# Patient Record
Sex: MALE | Race: BLACK OR AFRICAN AMERICAN | NOT HISPANIC OR LATINO | Employment: UNEMPLOYED | URBAN - METROPOLITAN AREA
[De-identification: names, ages, dates, MRNs, and addresses within clinical notes are randomized per-mention and may not be internally consistent; named-entity substitution may affect disease eponyms.]

---

## 2023-06-19 ENCOUNTER — HOSPITAL ENCOUNTER (EMERGENCY)
Facility: HOSPITAL | Age: 33
Discharge: HOME | End: 2023-06-19
Attending: EMERGENCY MEDICINE
Payer: MEDICAID

## 2023-06-19 VITALS
DIASTOLIC BLOOD PRESSURE: 88 MMHG | OXYGEN SATURATION: 99 % | BODY MASS INDEX: 26.6 KG/M2 | SYSTOLIC BLOOD PRESSURE: 139 MMHG | RESPIRATION RATE: 18 BRPM | HEART RATE: 91 BPM | TEMPERATURE: 99 F | HEIGHT: 71 IN | WEIGHT: 190 LBS

## 2023-06-19 DIAGNOSIS — K61.1 PERIRECTAL ABSCESS: Primary | ICD-10-CM

## 2023-06-19 LAB
ALBUMIN SERPL-MCNC: 3.6 G/DL (ref 3.5–5)
ALBUMIN/GLOB SERPL: 0.8 RATIO (ref 1.1–2)
ALP SERPL-CCNC: 90 UNIT/L (ref 40–150)
ALT SERPL-CCNC: 18 UNIT/L (ref 0–55)
APPEARANCE UR: CLEAR
AST SERPL-CCNC: 16 UNIT/L (ref 5–34)
BACTERIA #/AREA URNS AUTO: NORMAL /HPF
BASOPHILS # BLD AUTO: 0.04 X10(3)/MCL
BASOPHILS NFR BLD AUTO: 0.3 %
BILIRUB UR QL STRIP.AUTO: NEGATIVE MG/DL
BILIRUBIN DIRECT+TOT PNL SERPL-MCNC: 0.4 MG/DL
BUN SERPL-MCNC: 7.2 MG/DL (ref 8.9–20.6)
CALCIUM SERPL-MCNC: 9.1 MG/DL (ref 8.4–10.2)
CHLORIDE SERPL-SCNC: 104 MMOL/L (ref 98–107)
CO2 SERPL-SCNC: 25 MMOL/L (ref 22–29)
COLOR UR: YELLOW
CREAT SERPL-MCNC: 1.04 MG/DL (ref 0.73–1.18)
EOSINOPHIL # BLD AUTO: 0.05 X10(3)/MCL (ref 0–0.9)
EOSINOPHIL NFR BLD AUTO: 0.4 %
ERYTHROCYTE [DISTWIDTH] IN BLOOD BY AUTOMATED COUNT: 14.3 % (ref 11.5–17)
GFR SERPLBLD CREATININE-BSD FMLA CKD-EPI: >60 MLS/MIN/1.73/M2
GLOBULIN SER-MCNC: 4.3 GM/DL (ref 2.4–3.5)
GLUCOSE SERPL-MCNC: 98 MG/DL (ref 74–100)
GLUCOSE UR QL STRIP.AUTO: NEGATIVE MG/DL
HCT VFR BLD AUTO: 40.2 % (ref 42–52)
HGB BLD-MCNC: 14 G/DL (ref 14–18)
IMM GRANULOCYTES # BLD AUTO: 0.05 X10(3)/MCL (ref 0–0.04)
IMM GRANULOCYTES NFR BLD AUTO: 0.4 %
KETONES UR QL STRIP.AUTO: NEGATIVE MG/DL
LEUKOCYTE ESTERASE UR QL STRIP.AUTO: NEGATIVE UNIT/L
LYMPHOCYTES # BLD AUTO: 5.14 X10(3)/MCL (ref 0.6–4.6)
LYMPHOCYTES NFR BLD AUTO: 43 %
MCH RBC QN AUTO: 28.1 PG (ref 27–31)
MCHC RBC AUTO-ENTMCNC: 34.8 G/DL (ref 33–36)
MCV RBC AUTO: 80.7 FL (ref 80–94)
MONOCYTES # BLD AUTO: 1.37 X10(3)/MCL (ref 0.1–1.3)
MONOCYTES NFR BLD AUTO: 11.5 %
NEUTROPHILS # BLD AUTO: 5.3 X10(3)/MCL (ref 2.1–9.2)
NEUTROPHILS NFR BLD AUTO: 44.4 %
NITRITE UR QL STRIP.AUTO: NEGATIVE
NRBC BLD AUTO-RTO: 0 %
PH UR STRIP.AUTO: >=9 [PH]
PLATELET # BLD AUTO: 397 X10(3)/MCL (ref 130–400)
PMV BLD AUTO: 9.9 FL (ref 7.4–10.4)
POTASSIUM SERPL-SCNC: 4.4 MMOL/L (ref 3.5–5.1)
PROT SERPL-MCNC: 7.9 GM/DL (ref 6.4–8.3)
PROT UR QL STRIP.AUTO: ABNORMAL MG/DL
RBC # BLD AUTO: 4.98 X10(6)/MCL (ref 4.7–6.1)
RBC #/AREA URNS AUTO: <5 /HPF
RBC UR QL AUTO: NEGATIVE UNIT/L
SODIUM SERPL-SCNC: 136 MMOL/L (ref 136–145)
SP GR UR STRIP.AUTO: 1.02 (ref 1–1.03)
SQUAMOUS #/AREA URNS AUTO: <5 /HPF
UROBILINOGEN UR STRIP-ACNC: 1 MG/DL
WBC # SPEC AUTO: 11.95 X10(3)/MCL (ref 4.5–11.5)
WBC #/AREA URNS AUTO: <5 /HPF

## 2023-06-19 PROCEDURE — 85025 COMPLETE CBC W/AUTO DIFF WBC: CPT | Performed by: NURSE PRACTITIONER

## 2023-06-19 PROCEDURE — 80053 COMPREHEN METABOLIC PANEL: CPT | Performed by: NURSE PRACTITIONER

## 2023-06-19 PROCEDURE — 10060 I&D ABSCESS SIMPLE/SINGLE: CPT

## 2023-06-19 PROCEDURE — 81001 URINALYSIS AUTO W/SCOPE: CPT | Performed by: NURSE PRACTITIONER

## 2023-06-19 PROCEDURE — 25500020 PHARM REV CODE 255

## 2023-06-19 PROCEDURE — 99285 EMERGENCY DEPT VISIT HI MDM: CPT | Mod: 25

## 2023-06-19 RX ORDER — LIDOCAINE HYDROCHLORIDE 10 MG/ML
INJECTION INFILTRATION; PERINEURAL
Status: DISCONTINUED
Start: 2023-06-19 | End: 2023-06-20 | Stop reason: HOSPADM

## 2023-06-19 RX ORDER — SULFAMETHOXAZOLE AND TRIMETHOPRIM 800; 160 MG/1; MG/1
1 TABLET ORAL 2 TIMES DAILY
Qty: 20 TABLET | Refills: 0 | Status: SHIPPED | OUTPATIENT
Start: 2023-06-19 | End: 2023-06-29

## 2023-06-19 RX ORDER — HYDROCODONE BITARTRATE AND ACETAMINOPHEN 5; 325 MG/1; MG/1
1 TABLET ORAL EVERY 8 HOURS PRN
Qty: 12 TABLET | Refills: 0 | Status: SHIPPED | OUTPATIENT
Start: 2023-06-19 | End: 2023-06-23

## 2023-06-19 RX ADMIN — IOPAMIDOL 100 ML: 755 INJECTION, SOLUTION INTRAVENOUS at 08:06

## 2023-06-19 NOTE — FIRST PROVIDER EVALUATION
Medical screening examination initiated.  I have conducted a focused provider triage encounter, findings are as follows:    Brief history of present illness:  33 y/o male presents with rectal pain and drainage. Hx perirectal abscess 3/2022 which was surgically drained. Feels similar. +chills/sweats.     There were no vitals filed for this visit.    Pertinent physical exam:  alert, nonlabored, ambulatory     Brief workup plan:  labs, urine    Preliminary workup initiated; this workup will be continued and followed by the physician or advanced practice provider that is assigned to the patient when roomed.   Patient notified  Patient would like a visit with our mental health provider in the office  Scheduled appt on 4/27

## 2023-06-20 NOTE — ED PROVIDER NOTES
Encounter Date: 6/19/2023       History     Chief Complaint   Patient presents with    Wound Check     Pt reports having pain in rectum and yellow discharge with bowel movement from rectum. Hx of previous rectal abcess. Denies fevers, reports chills. Has been taking amoxicillin from mothers prescription. Denies hx of DM.     32-year-old male presents to ED for evaluation of rectal pain for the past 3 days.  Patient reports pain to his left butt cheek.  Reports feeling like he has a rectal abscess.  States that he previously had an abscess that had to be drained.  Denies any fever.  Complains of yellow drainage.  Reports he has been having chills at home.  Started taking amoxicillin given to him by his mother.  Denies any history of diabetes.    The history is provided by the patient. No  was used.   Review of patient's allergies indicates:  No Known Allergies  No past medical history on file.  No past surgical history on file.  No family history on file.     Review of Systems   Constitutional:  Negative for chills, fatigue and fever.   HENT:  Negative for sore throat.    Respiratory:  Negative for cough and shortness of breath.    Cardiovascular:  Negative for chest pain.   Gastrointestinal:  Positive for rectal pain. Negative for abdominal pain, nausea and vomiting.   Genitourinary:  Negative for dysuria and frequency.   Musculoskeletal:  Negative for back pain, myalgias and neck pain.   Skin:  Negative for rash.   Neurological:  Negative for dizziness, weakness and headaches.   Hematological:  Does not bruise/bleed easily.   All other systems reviewed and are negative.    Physical Exam     Initial Vitals [06/19/23 1621]   BP Pulse Resp Temp SpO2   139/88 91 18 98.7 °F (37.1 °C) 99 %      MAP       --         Physical Exam    Nursing note and vitals reviewed.  Constitutional: He appears well-developed. He is cooperative.   HENT:   Head: Normocephalic and atraumatic.   Right Ear: Tympanic  membrane and external ear normal.   Left Ear: Tympanic membrane and external ear normal.   Mouth/Throat: Uvula is midline, oropharynx is clear and moist and mucous membranes are normal. No trismus in the jaw. No uvula swelling.   Eyes: Conjunctivae are normal. Pupils are equal, round, and reactive to light.   Neck: Neck supple.   Normal range of motion.  Cardiovascular:  Normal rate, regular rhythm and normal heart sounds.           Pulmonary/Chest: Breath sounds normal. No respiratory distress. He has no wheezes. He has no rhonchi. He has no rales.   Abdominal: Abdomen is soft. Bowel sounds are normal. There is no abdominal tenderness.   Genitourinary:    Genitourinary Comments: 2 cm area raised with fluctuance.  Mild erythema.  No drainage noted.  Areas not track to anus.  Exam performed with ERVIN Walton at bedside         Musculoskeletal:         General: Normal range of motion.      Cervical back: Normal range of motion and neck supple.     Neurological: He is alert and oriented to person, place, and time.   Skin: Skin is warm and dry. Capillary refill takes less than 2 seconds.   Psychiatric: He has a normal mood and affect.       ED Course   I & D - Incision and Drainage    Date/Time: 2023 10:54 PM  Location procedure was performed: [facility] EMERGENCY DEPARTMENT  Performed by: BULMARO Andrade  Authorized by: Christal Gaming MD   Consent Done: Yes  Consent: Verbal consent obtained.  Patient identity confirmed: , name and verbally with patient  Type: abscess  Location: Left buttock.  Anesthesia: local infiltration    Anesthesia:  Local Anesthetic: lidocaine 1% without epinephrine  Anesthetic total: 4 mL  Scalpel size: 11  Incision type: single straight  Incision depth: dermal  Complexity: simple  Drainage: pus and bloody  Drainage amount: scant  Wound treatment: wound left open, drainage, deloculation and wound packed  Packing material: 1/4 in iodoform gauze  Patient tolerance: Patient tolerated the procedure  well with no immediate complications    Incision depth: dermal      Labs Reviewed   CBC WITH DIFFERENTIAL - Abnormal; Notable for the following components:       Result Value    WBC 11.95 (*)     Hct 40.2 (*)     Lymph # 5.14 (*)     Mono # 1.37 (*)     IG# 0.05 (*)     All other components within normal limits   COMPREHENSIVE METABOLIC PANEL - Abnormal; Notable for the following components:    Blood Urea Nitrogen 7.2 (*)     Globulin 4.3 (*)     Albumin/Globulin Ratio 0.8 (*)     All other components within normal limits   URINALYSIS, REFLEX TO URINE CULTURE - Abnormal; Notable for the following components:    pH, UA >=9.0 (*)     Protein, UA 1+ (*)     All other components within normal limits   URINALYSIS, MICROSCOPIC - Normal          Imaging Results              CT Pelvis With Contrast (Final result)  Result time 06/19/23 20:37:24      Final result by Charly Greenberg MD (06/19/23 20:37:24)                   Impression:      1. Left perianal subcutaneous rim enhancing fluid collection with surrounding inflammatory standings consistent with an abscess.    2. Pelvic large free fluid is of indeterminate cause.    3.  Bilateral inguinal enlarged lymph nodes likely are reactive.      Electronically signed by: Charly Greenberg  Date:    06/19/2023  Time:    20:37               Narrative:    EXAMINATION:  CT PELVIS WITH  CONTRAST    CLINICAL HISTORY:  Anal/rectal abscess;    TECHNIQUE:  Multidetector axial images were performed of the pelvis in various planes by the sonographer.    Dose length product was 251 mGycm. Automated radiation control was utilized to minimize radiation dose.    COMPARISON:  None available    FINDINGS:  There is left perianal rim enhancing fluid collection consistent with an abscess which measures 3.6 x 1.8 cm on image 51 series 2.  There are surrounding soft tissue inflammations.  There does not appear to be anorectal mural involvement.    Within the pelvis, there is large free fluid with  anterior-posterior diameter of 10.6 cm, transverse diameter of 7.0 cm and maximum length of 11.0 cm on image 27 series 2 and image 65 series 8.  The cause of this free fluid is indeterminate.  Small bowel loops and colon within the pelvis are nondistended and there are no pericolonic acute standings.  Appendix is also unremarkable.  There are bilateral inguinal enhancing enlarged lymph nodes up to 2.2 cm and likely are reactive/ urinary bladder wall is not thickened.                                       Medications   LIDOcaine HCL 10 mg/ml (1%) 10 mg/mL (1 %) injection (has no administration in time range)   iopamidoL (ISOVUE-370) injection 100 mL (100 mLs Intravenous Given 6/19/23 2021)     Medical Decision Making:   Initial Assessment:   32-year-old male presents to ED for evaluation of rectal pain for the past 3 days.  Patient reports pain to his left butt cheek.  Reports feeling like he has a rectal abscess.  States that he previously had an abscess that had to be drained.  Denies any fever.  Complains of yellow drainage.  Reports he has been having chills at home.  Started taking amoxicillin given to him by his mother.  Denies any history of diabetes.  Differential Diagnosis:   Perirectal abscess, rectal abscess, cellulitis  Clinical Tests:   Lab Tests: Ordered and Reviewed  ED Management:  32-year-old male presents to ED for evaluation of perirectal abscess.  Patient reports history of abscess.  States he has been having pain and swelling for the last 3 days.  Mild leukocytosis noted.  I&D performed with minimal drainage.  Wound packed.  Patient tolerated well.  Discussed pulling out packing in 2-3 days.  Will place on antibiotics. Return ED precautions given.  I provided counseling to patient with regard to condition, the treatment plan, specific conditions for return, and the importance of follow up. Detailed written and verbal instructions provided to patient and he expressed a verbal understanding of the  discharge instructions and overall management plan. Reiterated the importance of medication administration and safety. Advised patient to follow up with primary care provider in 3-5 days or sooner if needed.  Answered questions at this time. The patient is stable for discharge.                           Clinical Impression:   Final diagnoses:  [K61.1] Perirectal abscess (Primary)        ED Disposition Condition    Discharge Stable          ED Prescriptions       Medication Sig Dispense Start Date End Date Auth. Provider    sulfamethoxazole-trimethoprim 800-160mg (BACTRIM DS) 800-160 mg Tab Take 1 tablet by mouth 2 (two) times daily. for 10 days 20 tablet 6/19/2023 6/29/2023 BULMARO Andrade    HYDROcodone-acetaminophen (NORCO) 5-325 mg per tablet Take 1 tablet by mouth every 8 (eight) hours as needed for Pain. 12 tablet 6/19/2023 6/23/2023 BULMARO Andrade          Follow-up Information       Follow up With Specialties Details Why Contact Info    PCP  In 1 week As needed, If you do not have a PCP you may call [phone] to help get set up If you do not have a PCP you may call [phone] to help get set up.             BULMARO Andrade  06/19/23 1100

## 2023-06-20 NOTE — DISCHARGE INSTRUCTIONS
Apply warm compresses to area.  Take full course of antibiotics.  Remove packing in 2-3 days.  Use ibuprofen and Tylenol for pain and swelling.  May use Norco for severe pain.  Do not drink or drive while taking narcotics this can be sedating.

## 2023-08-25 ENCOUNTER — HOSPITAL ENCOUNTER (EMERGENCY)
Facility: HOSPITAL | Age: 33
Discharge: LEFT BEFORE DISCHARGE | End: 2023-08-25
Attending: EMERGENCY MEDICINE
Payer: MEDICAID

## 2023-08-25 VITALS
TEMPERATURE: 99 F | WEIGHT: 183 LBS | HEART RATE: 76 BPM | BODY MASS INDEX: 25.62 KG/M2 | RESPIRATION RATE: 18 BRPM | OXYGEN SATURATION: 100 % | HEIGHT: 71 IN | DIASTOLIC BLOOD PRESSURE: 95 MMHG | SYSTOLIC BLOOD PRESSURE: 156 MMHG

## 2023-08-25 DIAGNOSIS — B16.9 ACUTE VIRAL HEPATITIS B WITHOUT COMA AND WITHOUT DELTA AGENT: Primary | ICD-10-CM

## 2023-08-25 DIAGNOSIS — R17 SCLERAL ICTERUS: ICD-10-CM

## 2023-08-25 LAB
ABS NEUT CALC (OHS): 3.1 X10(3)/MCL (ref 2.1–9.2)
ALBUMIN SERPL-MCNC: 3.7 G/DL (ref 3.5–5)
ALBUMIN/GLOB SERPL: 0.9 RATIO (ref 1.1–2)
ALP SERPL-CCNC: 180 UNIT/L (ref 40–150)
ALT SERPL-CCNC: 2351 UNIT/L (ref 0–55)
AMPHET UR QL SCN: NEGATIVE
ANISOCYTOSIS BLD QL SMEAR: ABNORMAL
APAP SERPL-MCNC: <17.4 UG/ML (ref 17.4–30)
APPEARANCE UR: ABNORMAL
AST SERPL-CCNC: 1557 UNIT/L (ref 5–34)
BACTERIA #/AREA URNS AUTO: ABNORMAL /HPF
BARBITURATE SCN PRESENT UR: NEGATIVE
BENZODIAZ UR QL SCN: POSITIVE
BILIRUB SERPL-MCNC: 9.6 MG/DL
BILIRUB UR QL STRIP.AUTO: ABNORMAL
BUN SERPL-MCNC: 7.4 MG/DL (ref 8.9–20.6)
CALCIUM SERPL-MCNC: 9.6 MG/DL (ref 8.4–10.2)
CANNABINOIDS UR QL SCN: POSITIVE
CHLORIDE SERPL-SCNC: 103 MMOL/L (ref 98–107)
CO2 SERPL-SCNC: 28 MMOL/L (ref 22–29)
COCAINE UR QL SCN: NEGATIVE
COLOR UR: ABNORMAL
CREAT SERPL-MCNC: 1.16 MG/DL (ref 0.73–1.18)
EOSINOPHIL NFR BLD MANUAL: 0.08 X10(3)/MCL (ref 0–0.9)
EOSINOPHIL NFR BLD MANUAL: 1 % (ref 0–8)
ERYTHROCYTE [DISTWIDTH] IN BLOOD BY AUTOMATED COUNT: 17 % (ref 11.5–17)
ETHANOL SERPL-MCNC: <10 MG/DL
FENTANYL UR QL SCN: NEGATIVE
GFR SERPLBLD CREATININE-BSD FMLA CKD-EPI: >60 MLS/MIN/1.73/M2
GLOBULIN SER-MCNC: 4 GM/DL (ref 2.4–3.5)
GLUCOSE SERPL-MCNC: 106 MG/DL (ref 74–100)
GLUCOSE UR QL STRIP.AUTO: NORMAL
HAV IGM SERPL QL IA: NONREACTIVE
HBV CORE IGM SERPL QL IA: REACTIVE
HBV SURFACE AG SERPL QL IA: REACTIVE
HBV SURFACE AG SERPLBLD QL IA.RAPID: NORMAL
HCT VFR BLD AUTO: 37.9 % (ref 42–52)
HCV AB SERPL QL IA: NONREACTIVE
HGB BLD-MCNC: 14.3 G/DL (ref 14–18)
HIV 1+2 AB+HIV1 P24 AG SERPL QL IA: NONREACTIVE
HYPOCHROMIA BLD QL SMEAR: ABNORMAL
INR PPP: 1.1
KETONES UR QL STRIP.AUTO: NEGATIVE
LACTATE SERPL-SCNC: 1.2 MMOL/L (ref 0.5–2.2)
LEUKOCYTE ESTERASE UR QL STRIP.AUTO: NEGATIVE
LYMPH ABN # BLD MANUAL: 1 %
LYMPHOCYTES NFR BLD MANUAL: 3.81 X10(3)/MCL
LYMPHOCYTES NFR BLD MANUAL: 48 % (ref 13–40)
MCH RBC QN AUTO: 28 PG (ref 27–31)
MCHC RBC AUTO-ENTMCNC: 37.7 G/DL (ref 33–36)
MCV RBC AUTO: 74.2 FL (ref 80–94)
MDMA UR QL SCN: NEGATIVE
MONOCYTES NFR BLD MANUAL: 0.87 X10(3)/MCL (ref 0.1–1.3)
MONOCYTES NFR BLD MANUAL: 11 % (ref 2–11)
MUCOUS THREADS URNS QL MICRO: ABNORMAL /LPF
NEUTROPHILS NFR BLD MANUAL: 39 % (ref 47–80)
NITRITE UR QL STRIP.AUTO: NEGATIVE
NRBC BLD AUTO-RTO: 0 %
OPIATES UR QL SCN: NEGATIVE
PCP UR QL: NEGATIVE
PH UR STRIP.AUTO: 6.5 [PH]
PH UR: 6.5 [PH] (ref 3–11)
PLATELET # BLD AUTO: 384 X10(3)/MCL (ref 130–400)
PLATELET # BLD EST: ADEQUATE 10*3/UL
PMV BLD AUTO: 10.6 FL (ref 7.4–10.4)
POTASSIUM SERPL-SCNC: 3.6 MMOL/L (ref 3.5–5.1)
PROT SERPL-MCNC: 7.7 GM/DL (ref 6.4–8.3)
PROT UR QL STRIP.AUTO: ABNORMAL
PROTHROMBIN TIME: 13.5 SECONDS (ref 11.4–14)
RBC # BLD AUTO: 5.11 X10(6)/MCL (ref 4.7–6.1)
RBC #/AREA URNS AUTO: ABNORMAL /HPF
RBC MORPH BLD: ABNORMAL
RBC UR QL AUTO: NEGATIVE
SODIUM SERPL-SCNC: 140 MMOL/L (ref 136–145)
SP GR UR STRIP.AUTO: 1.02
SQUAMOUS #/AREA URNS LPF: ABNORMAL /HPF
TARGETS BLD QL SMEAR: ABNORMAL
UROBILINOGEN UR STRIP-ACNC: ABNORMAL
WBC # SPEC AUTO: 7.94 X10(3)/MCL (ref 4.5–11.5)
WBC #/AREA URNS AUTO: ABNORMAL /HPF

## 2023-08-25 PROCEDURE — 85610 PROTHROMBIN TIME: CPT | Performed by: PHYSICIAN ASSISTANT

## 2023-08-25 PROCEDURE — 83605 ASSAY OF LACTIC ACID: CPT | Performed by: PHYSICIAN ASSISTANT

## 2023-08-25 PROCEDURE — 81001 URINALYSIS AUTO W/SCOPE: CPT | Performed by: PHYSICIAN ASSISTANT

## 2023-08-25 PROCEDURE — 85027 COMPLETE CBC AUTOMATED: CPT | Performed by: PHYSICIAN ASSISTANT

## 2023-08-25 PROCEDURE — 80074 ACUTE HEPATITIS PANEL: CPT | Performed by: PHYSICIAN ASSISTANT

## 2023-08-25 PROCEDURE — 80053 COMPREHEN METABOLIC PANEL: CPT | Performed by: PHYSICIAN ASSISTANT

## 2023-08-25 PROCEDURE — 99283 EMERGENCY DEPT VISIT LOW MDM: CPT

## 2023-08-25 PROCEDURE — 87341 HEP B SURFACE AG NEUTRLZJ IA: CPT | Performed by: PHYSICIAN ASSISTANT

## 2023-08-25 PROCEDURE — 80307 DRUG TEST PRSMV CHEM ANLYZR: CPT | Performed by: PHYSICIAN ASSISTANT

## 2023-08-25 PROCEDURE — 80143 DRUG ASSAY ACETAMINOPHEN: CPT | Performed by: PHYSICIAN ASSISTANT

## 2023-08-25 PROCEDURE — 87389 HIV-1 AG W/HIV-1&-2 AB AG IA: CPT | Performed by: PHYSICIAN ASSISTANT

## 2023-08-25 PROCEDURE — 82077 ASSAY SPEC XCP UR&BREATH IA: CPT | Performed by: PHYSICIAN ASSISTANT

## 2023-08-25 NOTE — FIRST PROVIDER EVALUATION
"Medical screening examination initiated.  I have conducted a focused provider triage encounter, findings are as follows:    Brief history of present illness:  yellow sclera with dark colored urine X 4-5 days, N/V X same and recent tattoo from a friend    Vitals:    08/25/23 1852   BP: (!) 170/106   Pulse: 90   Resp: 16   Temp: 98.6 °F (37 °C)   TempSrc: Oral   SpO2: 100%   Weight: 83 kg (182 lb 15.7 oz)   Height: 5' 11" (1.803 m)       Pertinent physical exam:  scleral icterus noted, hypertensive in triage    Brief workup plan:  labs ordered    Preliminary workup initiated; this workup will be continued and followed by the physician or advanced practice provider that is assigned to the patient when roomed.  "

## 2023-08-25 NOTE — LETTER
Patient: Vickey Coreas  YOB: 1990  Date: 8/25/2023 Time: 9:09 PM  Location: [facility]    Leaving the Hospital Against Medical Advice    Chart #:[id number]    This will certify that I, the undersigned,    ______________________________________________________________________    A patient in the above named medical center, having requested discharge and removal from the medical center against the advice of my attending physician(s), hereby release [facility], its physicians, officers and employees, severally and individually, from any and all liability of any nature whatsoever for any injury or harm or complication of any kind that may result directly or indirectly, by reason of my terminating my stay as a patient at [facility] and my departure from [facility], and hereby waive any and all rights of action I may now have or later acquire as a result of my voluntary departure from [facility] and the termination of my stay as a patient therein.    This release is made with the full knowledge of the danger that may result from the action which I am taking.      Date:_______________________                         ___________________________                                                                                    Patient/Legal Representative    Witness:        ____________________________                          ___________________________  Nurse                                                                        Physician

## 2023-08-26 NOTE — ED NOTES
Pt wants to leave AMA. States he will come back tomorrow to get his labs checked, because he has plans tonight. BULMARO Jane educated pt on possible risks with leaving AMA. Pt verbalized understanding and still wants to go home. PA in room. Pt signed AMA form and ambulated out of the ED with steady gait.

## 2023-08-29 LAB — PATH REV: NORMAL

## 2023-09-05 ENCOUNTER — TELEPHONE (OUTPATIENT)
Dept: INFECTIOUS DISEASES | Facility: CLINIC | Age: 33
End: 2023-09-05
Payer: MEDICAID

## 2023-09-05 DIAGNOSIS — B19.10 HEPATITIS B INFECTION WITHOUT DELTA AGENT WITHOUT HEPATIC COMA, UNSPECIFIED CHRONICITY: Primary | ICD-10-CM

## 2023-09-05 NOTE — TELEPHONE ENCOUNTER
----- Message from Betina Bella sent at 9/1/2023  2:29 PM CDT -----  Regarding: Labs  Pt is scheduled for a New Hep B appt on 9/18 and is requesting labs faxed to [facility].

## 2023-11-30 ENCOUNTER — TELEPHONE (OUTPATIENT)
Dept: INFECTIOUS DISEASES | Facility: CLINIC | Age: 33
End: 2023-11-30
Payer: MEDICAID

## 2023-11-30 NOTE — TELEPHONE ENCOUNTER
----- Message from [employer] sent at 11/28/2023 11:55 AM CST -----  Regarding: Unable to Schedule  Good Morning  We were unable to contact this patient to schedule imaging.  Please see notes below  Thanks    11-28-23 call to [facility]-no ans-nvm set up...sfd 11:46  10/13 no show--TC  09/11/2023 at 2:37pm no answer, no vm, unable to reach patrient--[facility]  [facility]  9/5/23ag called to [facility] no

## 2023-12-20 ENCOUNTER — HOSPITAL ENCOUNTER (EMERGENCY)
Facility: HOSPITAL | Age: 33
Discharge: HOME | End: 2023-12-20
Attending: EMERGENCY MEDICINE
Payer: MEDICAID

## 2023-12-20 VITALS
OXYGEN SATURATION: 99 % | HEIGHT: 71 IN | HEART RATE: 96 BPM | SYSTOLIC BLOOD PRESSURE: 164 MMHG | WEIGHT: 192 LBS | DIASTOLIC BLOOD PRESSURE: 95 MMHG | BODY MASS INDEX: 26.88 KG/M2 | RESPIRATION RATE: 18 BRPM | TEMPERATURE: 98 F

## 2023-12-20 DIAGNOSIS — J06.9 VIRAL URI WITH COUGH: Primary | ICD-10-CM

## 2023-12-20 PROCEDURE — 96372 THER/PROPH/DIAG INJ SC/IM: CPT | Performed by: PHYSICIAN ASSISTANT

## 2023-12-20 PROCEDURE — 63600175 PHARM REV CODE 636 W HCPCS: Performed by: PHYSICIAN ASSISTANT

## 2023-12-20 PROCEDURE — 99284 EMERGENCY DEPT VISIT MOD MDM: CPT

## 2023-12-20 RX ORDER — BENZONATATE 100 MG/1
100 CAPSULE ORAL 3 TIMES DAILY PRN
Qty: 20 CAPSULE | Refills: 0 | Status: SHIPPED | OUTPATIENT
Start: 2023-12-20 | End: 2023-12-30

## 2023-12-20 RX ORDER — FLUTICASONE PROPIONATE 50 MCG
1 SPRAY, SUSPENSION (ML) NASAL 2 TIMES DAILY PRN
Qty: 15 G | Refills: 0 | Status: SHIPPED | OUTPATIENT
Start: 2023-12-20

## 2023-12-20 RX ORDER — LORATADINE 10 MG/1
10 TABLET ORAL DAILY
Qty: 10 TABLET | Refills: 0 | Status: SHIPPED | OUTPATIENT
Start: 2023-12-20 | End: 2023-12-30

## 2023-12-20 RX ORDER — DEXAMETHASONE SODIUM PHOSPHATE 4 MG/ML
4 INJECTION, SOLUTION INTRA-ARTICULAR; INTRALESIONAL; INTRAMUSCULAR; INTRAVENOUS; SOFT TISSUE
Status: COMPLETED | OUTPATIENT
Start: 2023-12-20 | End: 2023-12-20

## 2023-12-20 RX ADMIN — DEXAMETHASONE SODIUM PHOSPHATE 4 MG: 4 INJECTION, SOLUTION INTRA-ARTICULAR; INTRALESIONAL; INTRAMUSCULAR; INTRAVENOUS; SOFT TISSUE at 08:12

## 2023-12-21 NOTE — ED PROVIDER NOTES
Encounter Date: 12/20/2023       History     Chief Complaint   Patient presents with    Nasal Congestion     Pt presents to ed with reports of congestion x1 week with cough.      Vickey Coreas is a 33 y.o. male with a history of HTN who presents to the ED complaining of cough and congestion x 1 week. His wife was sick with similar symptoms. He has not tried taking any OTC medications. Requesting steroid shot for symptom relief. Denies fever, chills, chest pain, SOB, N/V/D.    The history is provided by the patient.     Review of patient's allergies indicates:  No Known Allergies  Past Medical History:   Diagnosis Date    Hypertension      No past surgical history on file.  No family history on file.  Social History     Tobacco Use    Smoking status: Every Day     Current packs/day: 1.00     Types: Cigarettes    Smokeless tobacco: Never     Review of Systems   Constitutional:  Negative for activity change, chills and fever.   HENT:  Positive for congestion. Negative for trouble swallowing.    Eyes:  Negative for photophobia and visual disturbance.   Respiratory:  Positive for cough. Negative for chest tightness, shortness of breath and wheezing.    Cardiovascular:  Negative for chest pain, palpitations and leg swelling.   Gastrointestinal:  Negative for abdominal pain, constipation, diarrhea, nausea and vomiting.   Genitourinary:  Negative for dysuria, frequency, hematuria and urgency.   Musculoskeletal:  Negative for arthralgias, back pain and gait problem.   Skin:  Negative for color change and rash.   Neurological:  Negative for dizziness, syncope, weakness, light-headedness, numbness and headaches.   Psychiatric/Behavioral:  Negative for agitation and confusion. The patient is not nervous/anxious.        Physical Exam     Initial Vitals [12/20/23 1953]   BP Pulse Resp Temp SpO2   (!) 164/95 96 18 98 °F (36.7 °C) 99 %      MAP       --         Physical Exam    Nursing note and vitals reviewed.  Constitutional: He  appears well-developed and well-nourished. No distress.   HENT:   Head: Normocephalic and atraumatic.   Mouth/Throat: No oropharyngeal exudate.   Nasal congestion. Postnasal drip.   Eyes: EOM are normal. No scleral icterus.   Neck: Neck supple.   Normal range of motion.  Cardiovascular:  Normal rate and regular rhythm.           No murmur heard.  Pulmonary/Chest: No respiratory distress. He has no wheezes.   Abdominal: Abdomen is soft. He exhibits no distension. There is no abdominal tenderness.   Musculoskeletal:         General: No edema. Normal range of motion.      Cervical back: Normal range of motion and neck supple.     Neurological: He is alert and oriented to person, place, and time. No cranial nerve deficit.   Skin: Skin is warm and dry. Capillary refill takes less than 2 seconds. No erythema.   Psychiatric: He has a normal mood and affect. Thought content normal.         ED Course   Procedures  Labs Reviewed - No data to display       Imaging Results    None          Medications   dexAMETHasone injection 4 mg (4 mg Intramuscular Given 12/20/23 2021)     Medical Decision Making  Pt given IM decadron for symptomatic relief. Will discharge with claritin, flonase, and tessalon prn for cough. Encouraged close follow up with PCP. ED return precautions given for any new or worsening symptoms.n he verbalized understanding. All questions answered.     Risk  OTC drugs.  Prescription drug management.                                      Clinical Impression:  Final diagnoses:  [J06.9] Viral URI with cough (Primary)          ED Disposition Condition    Discharge Stable          ED Prescriptions       Medication Sig Dispense Start Date End Date Auth. Provider    fluticasone propionate (FLONASE) 50 mcg/actuation nasal spray 1 spray (50 mcg total) by Each Nostril route 2 (two) times daily as needed for Rhinitis. 15 g 12/20/2023 -- Lucinda Maldonado, MILDRED    loratadine (CLARITIN) 10 mg tablet Take 1 tablet (10 mg  total) by mouth once daily. for 10 days 10 tablet 12/20/2023 12/30/2023 Lucinda Maldonado PA-C    benzonatate (TESSALON) 100 MG capsule Take 1 capsule (100 mg total) by mouth 3 (three) times daily as needed for Cough. 20 capsule 12/20/2023 12/30/2023 Lucinda Maldonado PA-C          Follow-up Information       Follow up With Specialties Details Why Contact Info    [facility] Emergency Medicine  If symptoms worsen [address] [facility] [id number] [phone]    PCP  In 3 days Hospital follow up              Lucinda Maldonado PA-C  12/20/23 2027

## 2025-01-01 ENCOUNTER — ANESTHESIA (OUTPATIENT)
Dept: SURGERY | Facility: HOSPITAL | Age: 35
DRG: 221 | End: 2025-01-01
Payer: MEDICAID

## 2025-01-01 ENCOUNTER — HOSPITAL ENCOUNTER (EMERGENCY)
Facility: HOSPITAL | Age: 35
Discharge: ANOTHER HOSPITAL | End: 2025-01-07
Attending: EMERGENCY MEDICINE
Payer: MEDICAID

## 2025-01-01 ENCOUNTER — HOSPITAL ENCOUNTER (INPATIENT)
Facility: HOSPITAL | Age: 35
LOS: 1 days | DRG: 221 | End: 2025-01-07
Attending: EMERGENCY MEDICINE | Admitting: STUDENT IN AN ORGANIZED HEALTH CARE EDUCATION/TRAINING PROGRAM
Payer: MEDICAID

## 2025-01-01 ENCOUNTER — ANESTHESIA EVENT (OUTPATIENT)
Dept: SURGERY | Facility: HOSPITAL | Age: 35
DRG: 221 | End: 2025-01-01
Payer: MEDICAID

## 2025-01-01 VITALS
OXYGEN SATURATION: 95 % | HEIGHT: 71 IN | BODY MASS INDEX: 28.7 KG/M2 | HEART RATE: 98 BPM | SYSTOLIC BLOOD PRESSURE: 137 MMHG | WEIGHT: 205 LBS | TEMPERATURE: 99 F | RESPIRATION RATE: 20 BRPM | DIASTOLIC BLOOD PRESSURE: 62 MMHG

## 2025-01-01 VITALS
HEART RATE: 103 BPM | HEIGHT: 71 IN | BODY MASS INDEX: 28.7 KG/M2 | SYSTOLIC BLOOD PRESSURE: 156 MMHG | TEMPERATURE: 98 F | WEIGHT: 205 LBS | OXYGEN SATURATION: 98 % | RESPIRATION RATE: 19 BRPM | DIASTOLIC BLOOD PRESSURE: 66 MMHG

## 2025-01-01 DIAGNOSIS — I71.010 DISSECTION OF ASCENDING AORTA: Primary | ICD-10-CM

## 2025-01-01 DIAGNOSIS — I71.010 DISSECTION OF ASCENDING AORTA: ICD-10-CM

## 2025-01-01 DIAGNOSIS — R20.0 LEFT LEG NUMBNESS: ICD-10-CM

## 2025-01-01 DIAGNOSIS — I16.1 HYPERTENSIVE EMERGENCY: Primary | ICD-10-CM

## 2025-01-01 DIAGNOSIS — Z98.890 POST-OPERATIVE STATE: ICD-10-CM

## 2025-01-01 DIAGNOSIS — R29.898 LEFT LEG WEAKNESS: ICD-10-CM

## 2025-01-01 LAB
ABO + RH BLD: NORMAL
ALBUMIN SERPL-MCNC: 3.9 G/DL (ref 3.5–5)
ALBUMIN/GLOB SERPL: 1.1 RATIO (ref 1.1–2)
ALP SERPL-CCNC: 73 UNIT/L (ref 40–150)
ALT SERPL-CCNC: 19 UNIT/L (ref 0–55)
AMPHET UR QL SCN: NEGATIVE
ANION GAP SERPL CALC-SCNC: 16 MEQ/L
ANION GAP SERPL CALC-SCNC: 23 MMOL/L (ref 8–16)
APTT PPP: 26.9 SECONDS (ref 23.2–33.7)
AST SERPL-CCNC: 23 UNIT/L (ref 5–34)
BACTERIA #/AREA URNS AUTO: ABNORMAL /HPF
BARBITURATE SCN PRESENT UR: NEGATIVE
BASOPHILS # BLD AUTO: 0.05 X10(3)/MCL
BASOPHILS NFR BLD AUTO: 0.5 %
BENZODIAZ UR QL SCN: NEGATIVE
BILIRUB SERPL-MCNC: 0.5 MG/DL
BILIRUB UR QL STRIP.AUTO: NEGATIVE
BLD GP AB SCN CELLS X3 SERPL QL: NORMAL
BLD PROD TYP BPU: NORMAL
BLOOD UNIT EXPIRATION DATE: NORMAL
BLOOD UNIT TYPE CODE: 5100
BLOOD UNIT TYPE CODE: 6200
BLOOD UNIT TYPE CODE: 9500
BLOOD UNIT TYPE: NORMAL
BUN SERPL-MCNC: 13.8 MG/DL (ref 8.9–20.6)
BUN SERPL-MCNC: 16 MG/DL (ref 6–30)
CALCIUM SERPL-MCNC: 8.8 MG/DL (ref 8.4–10.2)
CANNABINOIDS UR QL SCN: NEGATIVE
CHLORIDE SERPL-SCNC: 103 MMOL/L (ref 95–110)
CHLORIDE SERPL-SCNC: 108 MMOL/L (ref 98–107)
CLARITY UR: CLEAR
CO2 SERPL-SCNC: 16 MMOL/L (ref 22–29)
COCAINE UR QL SCN: NEGATIVE
CODING SYSTEM: NORMAL
COLOR UR AUTO: COLORLESS
CREAT SERPL-MCNC: 1.64 MG/DL (ref 0.72–1.25)
CREAT SERPL-MCNC: 1.7 MG/DL (ref 0.5–1.4)
CREAT/UREA NIT SERPL: 8
CROSSMATCH INTERPRETATION: NORMAL
DISPENSE STATUS: NORMAL
EOSINOPHIL # BLD AUTO: 0.05 X10(3)/MCL (ref 0–0.9)
EOSINOPHIL NFR BLD AUTO: 0.5 %
ERYTHROCYTE [DISTWIDTH] IN BLOOD BY AUTOMATED COUNT: 14.4 % (ref 11.5–17)
ETHANOL SERPL-MCNC: <10 MG/DL
FENTANYL UR QL SCN: NEGATIVE
FIBRINOGEN PPP-MCNC: <70 MG/DL (ref 182–400)
FIO2: 70
GFR SERPLBLD CREATININE-BSD FMLA CKD-EPI: 56 ML/MIN/1.73/M2
GLOBULIN SER-MCNC: 3.4 GM/DL (ref 2.4–3.5)
GLUCOSE SERPL-MCNC: 112 MG/DL (ref 70–110)
GLUCOSE SERPL-MCNC: 113 MG/DL (ref 70–110)
GLUCOSE SERPL-MCNC: 114 MG/DL (ref 70–110)
GLUCOSE SERPL-MCNC: 144 MG/DL (ref 70–110)
GLUCOSE SERPL-MCNC: 144 MG/DL (ref 70–110)
GLUCOSE SERPL-MCNC: 145 MG/DL (ref 70–110)
GLUCOSE SERPL-MCNC: 154 MG/DL (ref 70–110)
GLUCOSE SERPL-MCNC: 155 MG/DL (ref 70–110)
GLUCOSE SERPL-MCNC: 224 MG/DL (ref 70–110)
GLUCOSE SERPL-MCNC: 251 MG/DL (ref 74–100)
GLUCOSE SERPL-MCNC: 257 MG/DL (ref 70–110)
GLUCOSE SERPL-MCNC: 262 MG/DL (ref 70–110)
GLUCOSE UR QL STRIP: ABNORMAL
GROUP & RH: NORMAL
HCO3 UR-SCNC: 11.9 MMOL/L (ref 24–28)
HCO3 UR-SCNC: 12.7 MMOL/L (ref 24–28)
HCO3 UR-SCNC: 15.8 MMOL/L (ref 24–28)
HCO3 UR-SCNC: 16.3 MMOL/L (ref 24–28)
HCO3 UR-SCNC: 18.1 MMOL/L (ref 24–28)
HCO3 UR-SCNC: 18.6 MMOL/L (ref 24–28)
HCO3 UR-SCNC: 18.9 MMOL/L (ref 24–28)
HCO3 UR-SCNC: 19.9 MMOL/L (ref 24–28)
HCO3 UR-SCNC: 21.9 MMOL/L (ref 24–28)
HCO3 UR-SCNC: 22.7 MMOL/L (ref 24–28)
HCT VFR BLD AUTO: 43 % (ref 42–52)
HCT VFR BLD CALC: 19 %PCV (ref 36–54)
HCT VFR BLD CALC: 19 %PCV (ref 36–54)
HCT VFR BLD CALC: 22 %PCV (ref 36–54)
HCT VFR BLD CALC: 24 %PCV (ref 36–54)
HCT VFR BLD CALC: 24 %PCV (ref 36–54)
HCT VFR BLD CALC: 25 %PCV (ref 36–54)
HCT VFR BLD CALC: 26 %PCV (ref 36–54)
HCT VFR BLD CALC: 26 %PCV (ref 36–54)
HCT VFR BLD CALC: 31 %PCV (ref 36–54)
HCT VFR BLD CALC: 44 %PCV (ref 36–54)
HCT VFR BLD CALC: 50 %PCV (ref 36–54)
HGB BLD-MCNC: 15 G/DL (ref 14–18)
HGB BLD-MCNC: 17 G/DL
HGB UR QL STRIP: ABNORMAL
IMM GRANULOCYTES # BLD AUTO: 0.13 X10(3)/MCL (ref 0–0.04)
IMM GRANULOCYTES NFR BLD AUTO: 1.2 %
INDIRECT COOMBS: NORMAL
INR PPP: 1.2
KETONES UR QL STRIP: NEGATIVE
LACTATE SERPL-SCNC: 4.4 MMOL/L (ref 0.5–2.2)
LDH SERPL L TO P-CCNC: 17.59 MMOL/L (ref 0.36–1.25)
LDH SERPL L TO P-CCNC: 18.05 MMOL/L (ref 0.36–1.25)
LDH SERPL L TO P-CCNC: 3.75 MMOL/L (ref 0.36–1.25)
LDH SERPL L TO P-CCNC: >20 MMOL/L (ref 0.36–1.25)
LEUKOCYTE ESTERASE UR QL STRIP: NEGATIVE
LYMPHOCYTES # BLD AUTO: 4.34 X10(3)/MCL (ref 0.6–4.6)
LYMPHOCYTES NFR BLD AUTO: 40.5 %
MAGNESIUM SERPL-MCNC: 2.1 MG/DL (ref 1.6–2.6)
MCH RBC QN AUTO: 28.9 PG (ref 27–31)
MCHC RBC AUTO-ENTMCNC: 34.9 G/DL (ref 33–36)
MCV RBC AUTO: 82.9 FL (ref 80–94)
MDMA UR QL SCN: NEGATIVE
MONOCYTES # BLD AUTO: 0.57 X10(3)/MCL (ref 0.1–1.3)
MONOCYTES NFR BLD AUTO: 5.3 %
MUCOUS THREADS URNS QL MICRO: ABNORMAL /LPF
NEUTROPHILS # BLD AUTO: 5.58 X10(3)/MCL (ref 2.1–9.2)
NEUTROPHILS NFR BLD AUTO: 52 %
NITRITE UR QL STRIP: NEGATIVE
NRBC BLD AUTO-RTO: 0 %
NUM UNITS TRANS FFP: NORMAL
NUM UNITS TRANS PACKED RBC: NORMAL
OHS QRS DURATION: 80 MS
OHS QTC CALCULATION: 533 MS
OPIATES UR QL SCN: NEGATIVE
PCO2 BLDA: 32.4 MMHG (ref 35–45)
PCO2 BLDA: 32.4 MMHG (ref 35–45)
PCO2 BLDA: 36.2 MMHG (ref 35–45)
PCO2 BLDA: 39.3 MMHG (ref 35–45)
PCO2 BLDA: 40.3 MMHG (ref 35–45)
PCO2 BLDA: 43.5 MMHG (ref 35–45)
PCO2 BLDA: 45.1 MMHG (ref 35–45)
PCO2 BLDA: 45.8 MMHG (ref 35–45)
PCO2 BLDA: 51 MMHG (ref 35–45)
PCO2 BLDA: 72.5 MMHG (ref 35–45)
PCP UR QL: NEGATIVE
PH SMN: 6.96 [PH] (ref 7.35–7.45)
PH SMN: 7.1 [PH] (ref 7.35–7.45)
PH SMN: 7.12 [PH] (ref 7.35–7.45)
PH SMN: 7.17 [PH] (ref 7.35–7.45)
PH SMN: 7.22 [PH] (ref 7.35–7.45)
PH SMN: 7.25 [PH] (ref 7.35–7.45)
PH SMN: 7.28 [PH] (ref 7.35–7.45)
PH SMN: 7.31 [PH] (ref 7.35–7.45)
PH SMN: 7.31 [PH] (ref 7.35–7.45)
PH SMN: 7.45 [PH] (ref 7.35–7.45)
PH UR STRIP: 6 [PH]
PH UR: 6 [PH] (ref 3–11)
PLATELET # BLD AUTO: 192 X10(3)/MCL (ref 130–400)
PMV BLD AUTO: 10.4 FL (ref 7.4–10.4)
PO2 BLDA: 127 MMHG (ref 80–100)
PO2 BLDA: 289 MMHG (ref 80–100)
PO2 BLDA: 298 MMHG (ref 80–100)
PO2 BLDA: 313 MMHG (ref 80–100)
PO2 BLDA: 322 MMHG (ref 80–100)
PO2 BLDA: 342 MMHG (ref 80–100)
PO2 BLDA: 357 MMHG (ref 80–100)
PO2 BLDA: 378 MMHG (ref 80–100)
PO2 BLDA: 400 MMHG (ref 80–100)
PO2 BLDA: 610 MMHG (ref 80–100)
POC BE: -1 MMOL/L
POC BE: -14 MMOL/L
POC BE: -16 MMOL/L
POC BE: -17 MMOL/L
POC BE: -17 MMOL/L
POC BE: -4 MMOL/L
POC BE: -7 MMOL/L
POC BE: -8 MMOL/L
POC BE: -8 MMOL/L
POC BE: -9 MMOL/L
POC IONIZED CALCIUM: 0.69 MMOL/L (ref 1.06–1.42)
POC IONIZED CALCIUM: 0.75 MMOL/L (ref 1.06–1.42)
POC IONIZED CALCIUM: 0.84 MMOL/L (ref 1.06–1.42)
POC IONIZED CALCIUM: 0.88 MMOL/L (ref 1.06–1.42)
POC IONIZED CALCIUM: 0.97 MMOL/L (ref 1.06–1.42)
POC IONIZED CALCIUM: 0.99 MMOL/L (ref 1.06–1.42)
POC IONIZED CALCIUM: 1.15 MMOL/L (ref 1.06–1.42)
POC IONIZED CALCIUM: 1.19 MMOL/L (ref 1.06–1.42)
POC IONIZED CALCIUM: 1.22 MMOL/L (ref 1.06–1.42)
POC IONIZED CALCIUM: 1.24 MMOL/L (ref 1.06–1.42)
POC IONIZED CALCIUM: 1.99 MMOL/L (ref 1.06–1.42)
POC PTINR: 1.3 (ref 0.9–1.2)
POC SATURATED O2: 100 % (ref 95–100)
POC SATURATED O2: 98 % (ref 95–100)
POC TCO2 (MEASURED): 20 MMOL/L (ref 23–29)
POC TCO2: 13 MMOL/L (ref 23–27)
POC TCO2: 14 MMOL/L (ref 23–27)
POC TCO2: 17 MMOL/L (ref 23–27)
POC TCO2: 18 MMOL/L (ref 23–27)
POC TCO2: 19 MMOL/L (ref 23–27)
POC TCO2: 20 MMOL/L (ref 23–27)
POC TCO2: 20 MMOL/L (ref 23–27)
POC TCO2: 21 MMOL/L (ref 23–27)
POC TCO2: 23 MMOL/L (ref 23–27)
POC TCO2: 24 MMOL/L (ref 23–27)
POTASSIUM BLD-SCNC: 3.3 MMOL/L (ref 3.5–5.1)
POTASSIUM BLD-SCNC: 4.6 MMOL/L (ref 3.5–5.1)
POTASSIUM BLD-SCNC: 4.6 MMOL/L (ref 3.5–5.1)
POTASSIUM BLD-SCNC: 4.7 MMOL/L (ref 3.5–5.1)
POTASSIUM BLD-SCNC: 4.8 MMOL/L (ref 3.5–5.1)
POTASSIUM BLD-SCNC: 5.2 MMOL/L (ref 3.5–5.1)
POTASSIUM BLD-SCNC: 5.4 MMOL/L (ref 3.5–5.1)
POTASSIUM BLD-SCNC: 5.8 MMOL/L (ref 3.5–5.1)
POTASSIUM BLD-SCNC: 5.9 MMOL/L (ref 3.5–5.1)
POTASSIUM BLD-SCNC: 6.7 MMOL/L (ref 3.5–5.1)
POTASSIUM BLD-SCNC: 7.1 MMOL/L (ref 3.5–5.1)
POTASSIUM SERPL-SCNC: 2.8 MMOL/L (ref 3.5–5.1)
PROT SERPL-MCNC: 7.3 GM/DL (ref 6.4–8.3)
PROT UR QL STRIP: ABNORMAL
PROTHROMBIN TIME: 15.5 SECONDS (ref 12.5–14.5)
RBC # BLD AUTO: 5.19 X10(6)/MCL (ref 4.7–6.1)
RBC #/AREA URNS AUTO: ABNORMAL /HPF
SAMPLE: ABNORMAL
SODIUM BLD-SCNC: 130 MMOL/L (ref 136–145)
SODIUM BLD-SCNC: 137 MMOL/L (ref 136–145)
SODIUM BLD-SCNC: 139 MMOL/L (ref 136–145)
SODIUM BLD-SCNC: 141 MMOL/L (ref 136–145)
SODIUM BLD-SCNC: 142 MMOL/L (ref 136–145)
SODIUM BLD-SCNC: 142 MMOL/L (ref 136–145)
SODIUM BLD-SCNC: 145 MMOL/L (ref 136–145)
SODIUM BLD-SCNC: 146 MMOL/L (ref 136–145)
SODIUM BLD-SCNC: 147 MMOL/L (ref 136–145)
SODIUM BLD-SCNC: 148 MMOL/L (ref 136–145)
SODIUM BLD-SCNC: 152 MMOL/L (ref 136–145)
SODIUM SERPL-SCNC: 140 MMOL/L (ref 136–145)
SP GR UR STRIP.AUTO: 1.01 (ref 1–1.03)
SPECIFIC GRAVITY, URINE AUTO (.000) (OHS): 1.01 (ref 1–1.03)
SPECIMEN OUTDATE: NORMAL
SPECIMEN OUTDATE: NORMAL
SQUAMOUS #/AREA URNS LPF: ABNORMAL /HPF
TRANS ERYTHROCYTES VOL PATIENT: NORMAL ML
TRANS ERYTHROCYTES VOL PATIENT: NORMAL ML
TROPONIN I SERPL-MCNC: 0.03 NG/ML (ref 0–0.04)
UNIT NUMBER: NORMAL
UNIT NUMBER: NORMAL
UROBILINOGEN UR STRIP-ACNC: NORMAL
WBC # BLD AUTO: 10.72 X10(3)/MCL (ref 4.5–11.5)
WBC #/AREA URNS AUTO: ABNORMAL /HPF

## 2025-01-01 PROCEDURE — 36000713 HC OR TIME LEV V EA ADD 15 MIN: Performed by: STUDENT IN AN ORGANIZED HEALTH CARE EDUCATION/TRAINING PROGRAM

## 2025-01-01 PROCEDURE — 82330 ASSAY OF CALCIUM: CPT | Mod: XB

## 2025-01-01 PROCEDURE — 99291 CRITICAL CARE FIRST HOUR: CPT

## 2025-01-01 PROCEDURE — 82077 ASSAY SPEC XCP UR&BREATH IA: CPT | Performed by: NURSE PRACTITIONER

## 2025-01-01 PROCEDURE — 36556 INSERT NON-TUNNEL CV CATH: CPT | Mod: 59,,, | Performed by: ANESTHESIOLOGY

## 2025-01-01 PROCEDURE — 37000009 HC ANESTHESIA EA ADD 15 MINS: Performed by: STUDENT IN AN ORGANIZED HEALTH CARE EDUCATION/TRAINING PROGRAM

## 2025-01-01 PROCEDURE — 63600175 PHARM REV CODE 636 W HCPCS: Performed by: EMERGENCY MEDICINE

## 2025-01-01 PROCEDURE — 96366 THER/PROPH/DIAG IV INF ADDON: CPT

## 2025-01-01 PROCEDURE — 27000191 HC C-V MONITORING

## 2025-01-01 PROCEDURE — 25000003 PHARM REV CODE 250: Performed by: ANESTHESIOLOGY

## 2025-01-01 PROCEDURE — 02RX0JZ REPLACEMENT OF THORACIC AORTA, ASCENDING/ARCH WITH SYNTHETIC SUBSTITUTE, OPEN APPROACH: ICD-10-PCS | Performed by: STUDENT IN AN ORGANIZED HEALTH CARE EDUCATION/TRAINING PROGRAM

## 2025-01-01 PROCEDURE — 27000445 HC TEMPORARY PACEMAKER LEADS

## 2025-01-01 PROCEDURE — 93325 DOPPLER ECHO COLOR FLOW MAPG: CPT | Mod: 26,,, | Performed by: ANESTHESIOLOGY

## 2025-01-01 PROCEDURE — 63600175 PHARM REV CODE 636 W HCPCS

## 2025-01-01 PROCEDURE — 84484 ASSAY OF TROPONIN QUANT: CPT | Performed by: NURSE PRACTITIONER

## 2025-01-01 PROCEDURE — 27800903 OPTIME MED/SURG SUP & DEVICES OTHER IMPLANTS: Performed by: STUDENT IN AN ORGANIZED HEALTH CARE EDUCATION/TRAINING PROGRAM

## 2025-01-01 PROCEDURE — 37799 UNLISTED PX VASCULAR SURGERY: CPT

## 2025-01-01 PROCEDURE — 85610 PROTHROMBIN TIME: CPT | Performed by: NURSE PRACTITIONER

## 2025-01-01 PROCEDURE — 82803 BLOOD GASES ANY COMBINATION: CPT

## 2025-01-01 PROCEDURE — 96375 TX/PRO/DX INJ NEW DRUG ADDON: CPT

## 2025-01-01 PROCEDURE — 85384 FIBRINOGEN ACTIVITY: CPT | Performed by: STUDENT IN AN ORGANIZED HEALTH CARE EDUCATION/TRAINING PROGRAM

## 2025-01-01 PROCEDURE — 27202608 HC CANNULA, MISC

## 2025-01-01 PROCEDURE — 83605 ASSAY OF LACTIC ACID: CPT

## 2025-01-01 PROCEDURE — L8670 VASCULAR GRAFT, SYNTHETIC: HCPCS | Performed by: STUDENT IN AN ORGANIZED HEALTH CARE EDUCATION/TRAINING PROGRAM

## 2025-01-01 PROCEDURE — 85520 HEPARIN ASSAY: CPT

## 2025-01-01 PROCEDURE — 02QF0ZZ REPAIR AORTIC VALVE, OPEN APPROACH: ICD-10-PCS | Performed by: STUDENT IN AN ORGANIZED HEALTH CARE EDUCATION/TRAINING PROGRAM

## 2025-01-01 PROCEDURE — 86920 COMPATIBILITY TEST SPIN: CPT | Performed by: ANESTHESIOLOGY

## 2025-01-01 PROCEDURE — 80053 COMPREHEN METABOLIC PANEL: CPT | Performed by: NURSE PRACTITIONER

## 2025-01-01 PROCEDURE — 25500020 PHARM REV CODE 255: Performed by: EMERGENCY MEDICINE

## 2025-01-01 PROCEDURE — 30233N1 TRANSFUSION OF NONAUTOLOGOUS RED BLOOD CELLS INTO PERIPHERAL VEIN, PERCUTANEOUS APPROACH: ICD-10-PCS | Performed by: STUDENT IN AN ORGANIZED HEALTH CARE EDUCATION/TRAINING PROGRAM

## 2025-01-01 PROCEDURE — 99282 EMERGENCY DEPT VISIT SF MDM: CPT | Mod: 25

## 2025-01-01 PROCEDURE — 27100026 HC SHUNT SENSOR, TERUMO

## 2025-01-01 PROCEDURE — 63600175 PHARM REV CODE 636 W HCPCS: Performed by: ANESTHESIOLOGY

## 2025-01-01 PROCEDURE — 27200953 HC CARDIOPLEGIA SYSTEM

## 2025-01-01 PROCEDURE — 80047 BASIC METABLC PNL IONIZED CA: CPT

## 2025-01-01 PROCEDURE — 25000003 PHARM REV CODE 250

## 2025-01-01 PROCEDURE — 93005 ELECTROCARDIOGRAM TRACING: CPT

## 2025-01-01 PROCEDURE — 85730 THROMBOPLASTIN TIME PARTIAL: CPT | Performed by: NURSE PRACTITIONER

## 2025-01-01 PROCEDURE — 20000000 HC ICU ROOM

## 2025-01-01 PROCEDURE — 83735 ASSAY OF MAGNESIUM: CPT | Performed by: NURSE PRACTITIONER

## 2025-01-01 PROCEDURE — 93010 ELECTROCARDIOGRAM REPORT: CPT | Mod: ,,, | Performed by: INTERNAL MEDICINE

## 2025-01-01 PROCEDURE — 5A1221Z PERFORMANCE OF CARDIAC OUTPUT, CONTINUOUS: ICD-10-PCS | Performed by: STUDENT IN AN ORGANIZED HEALTH CARE EDUCATION/TRAINING PROGRAM

## 2025-01-01 PROCEDURE — 85025 COMPLETE CBC W/AUTO DIFF WBC: CPT | Performed by: NURSE PRACTITIONER

## 2025-01-01 PROCEDURE — P9016 RBC LEUKOCYTES REDUCED: HCPCS | Performed by: STUDENT IN AN ORGANIZED HEALTH CARE EDUCATION/TRAINING PROGRAM

## 2025-01-01 PROCEDURE — 81001 URINALYSIS AUTO W/SCOPE: CPT | Performed by: NURSE PRACTITIONER

## 2025-01-01 PROCEDURE — 27201015 HC HEMO-CONCENTRATOR

## 2025-01-01 PROCEDURE — 37000008 HC ANESTHESIA 1ST 15 MINUTES: Performed by: STUDENT IN AN ORGANIZED HEALTH CARE EDUCATION/TRAINING PROGRAM

## 2025-01-01 PROCEDURE — 86901 BLOOD TYPING SEROLOGIC RH(D): CPT | Performed by: ANESTHESIOLOGY

## 2025-01-01 PROCEDURE — 86920 COMPATIBILITY TEST SPIN: CPT | Performed by: STUDENT IN AN ORGANIZED HEALTH CARE EDUCATION/TRAINING PROGRAM

## 2025-01-01 PROCEDURE — 27000175 HC ADULT BYPASS PUMP

## 2025-01-01 PROCEDURE — 82565 ASSAY OF CREATININE: CPT

## 2025-01-01 PROCEDURE — 83605 ASSAY OF LACTIC ACID: CPT | Performed by: EMERGENCY MEDICINE

## 2025-01-01 PROCEDURE — 93312 ECHO TRANSESOPHAGEAL: CPT | Mod: 26,59,, | Performed by: ANESTHESIOLOGY

## 2025-01-01 PROCEDURE — C1768 GRAFT, VASCULAR: HCPCS | Performed by: STUDENT IN AN ORGANIZED HEALTH CARE EDUCATION/TRAINING PROGRAM

## 2025-01-01 PROCEDURE — 36620 INSERTION CATHETER ARTERY: CPT | Mod: 59,,, | Performed by: ANESTHESIOLOGY

## 2025-01-01 PROCEDURE — 27100088 HC CELL SAVER

## 2025-01-01 PROCEDURE — 86900 BLOOD TYPING SEROLOGIC ABO: CPT | Mod: 91 | Performed by: EMERGENCY MEDICINE

## 2025-01-01 PROCEDURE — P9016 RBC LEUKOCYTES REDUCED: HCPCS | Performed by: ANESTHESIOLOGY

## 2025-01-01 PROCEDURE — 96365 THER/PROPH/DIAG IV INF INIT: CPT

## 2025-01-01 PROCEDURE — 96365 THER/PROPH/DIAG IV INF INIT: CPT | Mod: 59

## 2025-01-01 PROCEDURE — 27201423 OPTIME MED/SURG SUP & DEVICES STERILE SUPPLY: Performed by: STUDENT IN AN ORGANIZED HEALTH CARE EDUCATION/TRAINING PROGRAM

## 2025-01-01 PROCEDURE — 82962 GLUCOSE BLOOD TEST: CPT

## 2025-01-01 PROCEDURE — 80307 DRUG TEST PRSMV CHEM ANLYZR: CPT | Performed by: NURSE PRACTITIONER

## 2025-01-01 PROCEDURE — 93320 DOPPLER ECHO COMPLETE: CPT | Mod: 26,,, | Performed by: ANESTHESIOLOGY

## 2025-01-01 PROCEDURE — 27201037 HC PRESSURE MONITORING SET UP

## 2025-01-01 PROCEDURE — 36000712 HC OR TIME LEV V 1ST 15 MIN: Performed by: STUDENT IN AN ORGANIZED HEALTH CARE EDUCATION/TRAINING PROGRAM

## 2025-01-01 DEVICE — GELWEAVE GELATIN IMPREGNATED WOVEN VASCULAR PROSTHESIS  BRANCHED ARCH GRAFT WITH RADIOPAQUE MARKERS
Type: IMPLANTABLE DEVICE | Site: HEART | Status: FUNCTIONAL
Brand: GELWEAVE™

## 2025-01-01 DEVICE — FELT THIN 4INX4IN 5EA/BX: Type: IMPLANTABLE DEVICE | Site: HEART | Status: FUNCTIONAL

## 2025-01-01 DEVICE — FELT TEFLON 1INX6IN: Type: IMPLANTABLE DEVICE | Site: HEART | Status: FUNCTIONAL

## 2025-01-01 DEVICE — GRAFT 8 X 30 GELWEAVE WOVEN: Type: IMPLANTABLE DEVICE | Site: HEART | Status: FUNCTIONAL

## 2025-01-01 RX ORDER — VASOPRESSIN 20 [USP'U]/ML
INJECTION, SOLUTION INTRAMUSCULAR; SUBCUTANEOUS
Status: DISCONTINUED | OUTPATIENT
Start: 2025-01-01 | End: 2025-01-01

## 2025-01-01 RX ORDER — MIDAZOLAM HYDROCHLORIDE 1 MG/ML
INJECTION INTRAMUSCULAR; INTRAVENOUS
Status: DISCONTINUED | OUTPATIENT
Start: 2025-01-01 | End: 2025-01-01

## 2025-01-01 RX ORDER — PROPOFOL 10 MG/ML
VIAL (ML) INTRAVENOUS
Status: DISCONTINUED | OUTPATIENT
Start: 2025-01-01 | End: 2025-01-01

## 2025-01-01 RX ORDER — ONDANSETRON HYDROCHLORIDE 2 MG/ML
INJECTION, SOLUTION INTRAVENOUS
Status: COMPLETED
Start: 2025-01-01 | End: 2025-01-01

## 2025-01-01 RX ORDER — NICARDIPINE HYDROCHLORIDE 0.2 MG/ML
INJECTION INTRAVENOUS
Status: DISCONTINUED
Start: 2025-01-01 | End: 2025-01-01 | Stop reason: HOSPADM

## 2025-01-01 RX ORDER — POTASSIUM CHLORIDE 7.45 MG/ML
10 INJECTION INTRAVENOUS
Status: DISCONTINUED | OUTPATIENT
Start: 2025-01-01 | End: 2025-01-01 | Stop reason: HOSPADM

## 2025-01-01 RX ORDER — POTASSIUM CHLORIDE 29.8 MG/ML
40 INJECTION INTRAVENOUS
Status: CANCELLED | OUTPATIENT
Start: 2025-01-01

## 2025-01-01 RX ORDER — HYDROCODONE BITARTRATE AND ACETAMINOPHEN 500; 5 MG/1; MG/1
TABLET ORAL
Status: DISCONTINUED | OUTPATIENT
Start: 2025-01-01 | End: 2025-01-01 | Stop reason: HOSPADM

## 2025-01-01 RX ORDER — MAGNESIUM SULFATE HEPTAHYDRATE 40 MG/ML
4 INJECTION, SOLUTION INTRAVENOUS
Status: CANCELLED | OUTPATIENT
Start: 2025-01-01

## 2025-01-01 RX ORDER — OXYCODONE HYDROCHLORIDE 5 MG/1
5 TABLET ORAL EVERY 4 HOURS PRN
Status: CANCELLED | OUTPATIENT
Start: 2025-01-01

## 2025-01-01 RX ORDER — CALCIUM GLUCONATE 20 MG/ML
3 INJECTION, SOLUTION INTRAVENOUS
Status: CANCELLED | OUTPATIENT
Start: 2025-01-01

## 2025-01-01 RX ORDER — CEFAZOLIN 2 G/1
2 INJECTION, POWDER, FOR SOLUTION INTRAMUSCULAR; INTRAVENOUS
Status: CANCELLED | OUTPATIENT
Start: 2025-01-01 | End: 2025-01-08

## 2025-01-01 RX ORDER — POLYETHYLENE GLYCOL 3350 17 G/17G
17 POWDER, FOR SOLUTION ORAL DAILY
Status: CANCELLED | OUTPATIENT
Start: 2025-01-01

## 2025-01-01 RX ORDER — SUCCINYLCHOLINE CHLORIDE 20 MG/ML
INJECTION INTRAMUSCULAR; INTRAVENOUS
Status: DISCONTINUED | OUTPATIENT
Start: 2025-01-01 | End: 2025-01-01

## 2025-01-01 RX ORDER — MUPIROCIN 20 MG/G
OINTMENT TOPICAL 2 TIMES DAILY
Status: CANCELLED | OUTPATIENT
Start: 2025-01-01 | End: 2025-01-09

## 2025-01-01 RX ORDER — FENTANYL CITRATE 50 UG/ML
INJECTION, SOLUTION INTRAMUSCULAR; INTRAVENOUS
Status: COMPLETED
Start: 2025-01-01 | End: 2025-01-01

## 2025-01-01 RX ORDER — NICARDIPINE HYDROCHLORIDE 0.2 MG/ML
0-15 INJECTION INTRAVENOUS CONTINUOUS
Status: DISCONTINUED | OUTPATIENT
Start: 2025-01-01 | End: 2025-01-01 | Stop reason: HOSPADM

## 2025-01-01 RX ORDER — NICARDIPINE HYDROCHLORIDE 0.2 MG/ML
INJECTION INTRAVENOUS CONTINUOUS PRN
Status: DISCONTINUED | OUTPATIENT
Start: 2025-01-01 | End: 2025-01-01

## 2025-01-01 RX ORDER — ESMOLOL HYDROCHLORIDE 20 MG/ML
0-300 INJECTION, SOLUTION INTRAVENOUS CONTINUOUS
Status: DISCONTINUED | OUTPATIENT
Start: 2025-01-01 | End: 2025-01-01 | Stop reason: HOSPADM

## 2025-01-01 RX ORDER — ASPIRIN 325 MG
325 TABLET, DELAYED RELEASE (ENTERIC COATED) ORAL DAILY
Status: CANCELLED | OUTPATIENT
Start: 2025-01-01

## 2025-01-01 RX ORDER — LIDOCAINE HYDROCHLORIDE 20 MG/ML
INJECTION, SOLUTION EPIDURAL; INFILTRATION; INTRACAUDAL; PERINEURAL
Status: DISCONTINUED | OUTPATIENT
Start: 2025-01-01 | End: 2025-01-01

## 2025-01-01 RX ORDER — TRANEXAMIC ACID 100 MG/ML
INJECTION, SOLUTION INTRAVENOUS CONTINUOUS PRN
Status: DISCONTINUED | OUTPATIENT
Start: 2025-01-01 | End: 2025-01-01

## 2025-01-01 RX ORDER — PHENYLEPHRINE HCL IN 0.9% NACL 1 MG/10 ML
SYRINGE (ML) INTRAVENOUS
Status: DISCONTINUED | OUTPATIENT
Start: 2025-01-01 | End: 2025-01-01

## 2025-01-01 RX ORDER — OXYCODONE HYDROCHLORIDE 5 MG/1
10 TABLET ORAL EVERY 4 HOURS PRN
Status: CANCELLED | OUTPATIENT
Start: 2025-01-01

## 2025-01-01 RX ORDER — CALCIUM GLUCONATE 20 MG/ML
1 INJECTION, SOLUTION INTRAVENOUS
Status: CANCELLED | OUTPATIENT
Start: 2025-01-01

## 2025-01-01 RX ORDER — CALCIUM CHLORIDE 100 MG/ML
INJECTION, SOLUTION INTRAVENOUS
Status: DISCONTINUED | OUTPATIENT
Start: 2025-01-01 | End: 2025-01-01

## 2025-01-01 RX ORDER — HEPARIN SODIUM 1000 [USP'U]/ML
INJECTION, SOLUTION INTRAVENOUS; SUBCUTANEOUS
Status: DISCONTINUED | OUTPATIENT
Start: 2025-01-01 | End: 2025-01-01

## 2025-01-01 RX ORDER — NOREPINEPHRINE BITARTRATE 1 MG/ML
INJECTION, SOLUTION INTRAVENOUS
Status: DISCONTINUED | OUTPATIENT
Start: 2025-01-01 | End: 2025-01-01

## 2025-01-01 RX ORDER — FENTANYL CITRATE 50 UG/ML
100 INJECTION, SOLUTION INTRAMUSCULAR; INTRAVENOUS
Status: COMPLETED | OUTPATIENT
Start: 2025-01-01 | End: 2025-01-01

## 2025-01-01 RX ORDER — ESMOLOL HYDROCHLORIDE 10 MG/ML
0.5 INJECTION INTRAVENOUS ONCE
Status: COMPLETED | OUTPATIENT
Start: 2025-01-01 | End: 2025-01-01

## 2025-01-01 RX ORDER — DOCUSATE SODIUM 100 MG/1
100 CAPSULE, LIQUID FILLED ORAL 2 TIMES DAILY
Status: CANCELLED | OUTPATIENT
Start: 2025-01-01

## 2025-01-01 RX ORDER — HYDROMORPHONE HYDROCHLORIDE 2 MG/ML
1 INJECTION, SOLUTION INTRAMUSCULAR; INTRAVENOUS; SUBCUTANEOUS
Status: COMPLETED | OUTPATIENT
Start: 2025-01-01 | End: 2025-01-01

## 2025-01-01 RX ORDER — TRANEXAMIC ACID 100 MG/ML
INJECTION, SOLUTION INTRAVENOUS
Status: DISCONTINUED | OUTPATIENT
Start: 2025-01-01 | End: 2025-01-01

## 2025-01-01 RX ORDER — SODIUM CHLORIDE 0.9 % (FLUSH) 0.9 %
10 SYRINGE (ML) INJECTION ONCE
Status: DISCONTINUED | OUTPATIENT
Start: 2025-01-01 | End: 2025-01-01

## 2025-01-01 RX ORDER — KETAMINE HCL IN 0.9 % NACL 50 MG/5 ML
SYRINGE (ML) INTRAVENOUS
Status: DISCONTINUED | OUTPATIENT
Start: 2025-01-01 | End: 2025-01-01

## 2025-01-01 RX ORDER — PHENYLEPHRINE HYDROCHLORIDE 10 MG/ML
INJECTION INTRAVENOUS
Status: DISCONTINUED | OUTPATIENT
Start: 2025-01-01 | End: 2025-01-01

## 2025-01-01 RX ORDER — ROCURONIUM BROMIDE 10 MG/ML
INJECTION, SOLUTION INTRAVENOUS
Status: DISCONTINUED | OUTPATIENT
Start: 2025-01-01 | End: 2025-01-01

## 2025-01-01 RX ORDER — CALCIUM GLUCONATE 20 MG/ML
2 INJECTION, SOLUTION INTRAVENOUS
Status: CANCELLED | OUTPATIENT
Start: 2025-01-01

## 2025-01-01 RX ORDER — POTASSIUM CHLORIDE 14.9 MG/ML
20 INJECTION INTRAVENOUS
Status: CANCELLED | OUTPATIENT
Start: 2025-01-01

## 2025-01-01 RX ORDER — CEFAZOLIN SODIUM 1 G/3ML
INJECTION, POWDER, FOR SOLUTION INTRAMUSCULAR; INTRAVENOUS
Status: DISCONTINUED | OUTPATIENT
Start: 2025-01-01 | End: 2025-01-01

## 2025-01-01 RX ORDER — ONDANSETRON HYDROCHLORIDE 2 MG/ML
4 INJECTION, SOLUTION INTRAVENOUS EVERY 6 HOURS PRN
Status: CANCELLED | OUTPATIENT
Start: 2025-01-01

## 2025-01-01 RX ORDER — FENTANYL CITRATE 50 UG/ML
INJECTION, SOLUTION INTRAMUSCULAR; INTRAVENOUS
Status: DISCONTINUED | OUTPATIENT
Start: 2025-01-01 | End: 2025-01-01

## 2025-01-01 RX ORDER — EPINEPHRINE 1 MG/ML
INJECTION, SOLUTION, CONCENTRATE INTRAVENOUS
Status: DISCONTINUED | OUTPATIENT
Start: 2025-01-01 | End: 2025-01-01

## 2025-01-01 RX ORDER — ONDANSETRON HYDROCHLORIDE 2 MG/ML
4 INJECTION, SOLUTION INTRAVENOUS
Status: COMPLETED | OUTPATIENT
Start: 2025-01-01 | End: 2025-01-01

## 2025-01-01 RX ORDER — ESMOLOL HYDROCHLORIDE 10 MG/ML
INJECTION INTRAVENOUS
Status: DISCONTINUED | OUTPATIENT
Start: 2025-01-01 | End: 2025-01-01

## 2025-01-01 RX ORDER — FAMOTIDINE 10 MG/ML
20 INJECTION INTRAVENOUS 2 TIMES DAILY
Status: CANCELLED | OUTPATIENT
Start: 2025-01-01

## 2025-01-01 RX ORDER — METOCLOPRAMIDE HYDROCHLORIDE 5 MG/ML
5 INJECTION INTRAMUSCULAR; INTRAVENOUS EVERY 6 HOURS PRN
Status: CANCELLED | OUTPATIENT
Start: 2025-01-01

## 2025-01-01 RX ORDER — MAGNESIUM SULFATE HEPTAHYDRATE 40 MG/ML
2 INJECTION, SOLUTION INTRAVENOUS
Status: CANCELLED | OUTPATIENT
Start: 2025-01-01

## 2025-01-01 RX ADMIN — NICARDIPINE HYDROCHLORIDE 5 MG/HR: 0.2 INJECTION, SOLUTION INTRAVENOUS at 11:01

## 2025-01-01 RX ADMIN — LIDOCAINE HYDROCHLORIDE 60 MG: 20 INJECTION, SOLUTION EPIDURAL; INFILTRATION; INTRACAUDAL at 02:01

## 2025-01-01 RX ADMIN — VASOPRESSIN 2 UNITS: 20 INJECTION INTRAVENOUS at 05:01

## 2025-01-01 RX ADMIN — POTASSIUM CHLORIDE 10 MEQ: 7.46 INJECTION, SOLUTION INTRAVENOUS at 12:01

## 2025-01-01 RX ADMIN — Medication 50 MG: at 03:01

## 2025-01-01 RX ADMIN — ESMOLOL HYDROCHLORIDE 47 MG: 100 INJECTION, SOLUTION INTRAVENOUS at 11:01

## 2025-01-01 RX ADMIN — ONDANSETRON 4 MG: 2 INJECTION INTRAMUSCULAR; INTRAVENOUS at 11:01

## 2025-01-01 RX ADMIN — MIDAZOLAM HYDROCHLORIDE 2 MG: 2 INJECTION, SOLUTION INTRAMUSCULAR; INTRAVENOUS at 02:01

## 2025-01-01 RX ADMIN — IOHEXOL 100 ML: 350 INJECTION, SOLUTION INTRAVENOUS at 11:01

## 2025-01-01 RX ADMIN — VASOPRESSIN 10 UNITS: 20 INJECTION INTRAVENOUS at 09:01

## 2025-01-01 RX ADMIN — ROCURONIUM BROMIDE 20 MG: 10 INJECTION, SOLUTION INTRAVENOUS at 04:01

## 2025-01-01 RX ADMIN — PROPOFOL 80 MG: 10 INJECTION, EMULSION INTRAVENOUS at 02:01

## 2025-01-01 RX ADMIN — PHENYLEPHRINE HYDROCHLORIDE 200 MCG: 10 INJECTION INTRAVENOUS at 04:01

## 2025-01-01 RX ADMIN — PHENYLEPHRINE HYDROCHLORIDE 100 MCG: 10 INJECTION INTRAVENOUS at 03:01

## 2025-01-01 RX ADMIN — NOREPINEPHRINE BITARTRATE 64 MCG: 1 INJECTION, SOLUTION, CONCENTRATE INTRAVENOUS at 09:01

## 2025-01-01 RX ADMIN — MIDAZOLAM HYDROCHLORIDE 2 MG: 2 INJECTION, SOLUTION INTRAMUSCULAR; INTRAVENOUS at 03:01

## 2025-01-01 RX ADMIN — VASOPRESSIN 3 UNITS: 20 INJECTION INTRAVENOUS at 05:01

## 2025-01-01 RX ADMIN — SODIUM CHLORIDE 4 UNITS/HR: 9 INJECTION, SOLUTION INTRAVENOUS at 05:01

## 2025-01-01 RX ADMIN — VASOPRESSIN 5 UNITS: 20 INJECTION INTRAVENOUS at 08:01

## 2025-01-01 RX ADMIN — NOREPINEPHRINE BITARTRATE 16 MCG: 1 INJECTION, SOLUTION, CONCENTRATE INTRAVENOUS at 05:01

## 2025-01-01 RX ADMIN — CALCIUM CHLORIDE 2 G: 100 INJECTION, SOLUTION INTRAVENOUS at 10:01

## 2025-01-01 RX ADMIN — ANGIOTENSIN II 20 NG/KG/MIN: 2.5 INJECTION INTRAVENOUS at 09:01

## 2025-01-01 RX ADMIN — Medication 200 MCG: at 05:01

## 2025-01-01 RX ADMIN — PHENYLEPHRINE HYDROCHLORIDE 100 MCG: 10 INJECTION INTRAVENOUS at 04:01

## 2025-01-01 RX ADMIN — FENTANYL CITRATE 100 MCG: 50 INJECTION, SOLUTION INTRAMUSCULAR; INTRAVENOUS at 02:01

## 2025-01-01 RX ADMIN — HYDROMORPHONE HYDROCHLORIDE 1 MG: 2 INJECTION INTRAMUSCULAR; INTRAVENOUS; SUBCUTANEOUS at 11:01

## 2025-01-01 RX ADMIN — ESMOLOL HYDROCHLORIDE 20 MG: 100 INJECTION, SOLUTION INTRAVENOUS at 02:01

## 2025-01-01 RX ADMIN — NOREPINEPHRINE BITARTRATE 8 MCG: 1 INJECTION, SOLUTION, CONCENTRATE INTRAVENOUS at 05:01

## 2025-01-01 RX ADMIN — HEPARIN SODIUM 29000 UNITS: 1000 INJECTION, SOLUTION INTRAVENOUS; SUBCUTANEOUS at 04:01

## 2025-01-01 RX ADMIN — EPINEPHRINE 40 MCG: 1 INJECTION, SOLUTION, CONCENTRATE INTRAVENOUS at 08:01

## 2025-01-01 RX ADMIN — VASOPRESSIN 1 UNITS: 20 INJECTION INTRAVENOUS at 05:01

## 2025-01-01 RX ADMIN — ESMOLOL HYDROCHLORIDE 25 MCG/KG/MIN: 20 INJECTION INTRAVENOUS at 11:01

## 2025-01-01 RX ADMIN — CALCIUM CHLORIDE 1 G: 100 INJECTION, SOLUTION INTRAVENOUS at 08:01

## 2025-01-01 RX ADMIN — SODIUM CHLORIDE, SODIUM GLUCONATE, SODIUM ACETATE, POTASSIUM CHLORIDE, MAGNESIUM CHLORIDE, SODIUM PHOSPHATE, DIBASIC, AND POTASSIUM PHOSPHATE: .53; .5; .37; .037; .03; .012; .00082 INJECTION, SOLUTION INTRAVENOUS at 02:01

## 2025-01-01 RX ADMIN — HUMAN INSULIN 3 UNITS: 100 INJECTION, SOLUTION SUBCUTANEOUS at 05:01

## 2025-01-01 RX ADMIN — PHENYLEPHRINE HYDROCHLORIDE 200 MCG: 10 INJECTION INTRAVENOUS at 05:01

## 2025-01-01 RX ADMIN — AMIODARONE HYDROCHLORIDE 150 MG: 50 INJECTION, SOLUTION INTRAVENOUS at 10:01

## 2025-01-01 RX ADMIN — PROPOFOL 50 MG: 10 INJECTION, EMULSION INTRAVENOUS at 05:01

## 2025-01-01 RX ADMIN — ROCURONIUM BROMIDE 50 MG: 10 INJECTION, SOLUTION INTRAVENOUS at 08:01

## 2025-01-01 RX ADMIN — TRANEXAMIC ACID 1 MG/KG/HR: 100 INJECTION INTRAVENOUS at 03:01

## 2025-01-01 RX ADMIN — HEPARIN SODIUM 10000 UNITS: 1000 INJECTION, SOLUTION INTRAVENOUS; SUBCUTANEOUS at 04:01

## 2025-01-01 RX ADMIN — VASOPRESSIN 0.08 UNITS/MIN: 20 INJECTION INTRAVENOUS at 07:01

## 2025-01-01 RX ADMIN — SODIUM CHLORIDE, SODIUM GLUCONATE, SODIUM ACETATE, POTASSIUM CHLORIDE, MAGNESIUM CHLORIDE, SODIUM PHOSPHATE, DIBASIC, AND POTASSIUM PHOSPHATE: .53; .5; .37; .037; .03; .012; .00082 INJECTION, SOLUTION INTRAVENOUS at 04:01

## 2025-01-01 RX ADMIN — EPINEPHRINE 40 MCG: 1 INJECTION, SOLUTION, CONCENTRATE INTRAVENOUS at 09:01

## 2025-01-01 RX ADMIN — ROCURONIUM BROMIDE 20 MG: 10 INJECTION, SOLUTION INTRAVENOUS at 02:01

## 2025-01-01 RX ADMIN — LIDOCAINE HYDROCHLORIDE 100 MG: 20 INJECTION, SOLUTION EPIDURAL; INFILTRATION; INTRACAUDAL at 09:01

## 2025-01-01 RX ADMIN — NICARDIPINE HYDROCHLORIDE 10 MG/HR: 0.2 INJECTION, SOLUTION INTRAVENOUS at 02:01

## 2025-01-01 RX ADMIN — PHENYLEPHRINE HYDROCHLORIDE 150 MCG: 10 INJECTION INTRAVENOUS at 04:01

## 2025-01-01 RX ADMIN — TRANEXAMIC ACID 1000 MG: 100 INJECTION INTRAVENOUS at 03:01

## 2025-01-01 RX ADMIN — ROCURONIUM BROMIDE 50 MG: 10 INJECTION, SOLUTION INTRAVENOUS at 03:01

## 2025-01-01 RX ADMIN — FENTANYL CITRATE 100 MCG: 50 INJECTION, SOLUTION INTRAMUSCULAR; INTRAVENOUS at 12:01

## 2025-01-01 RX ADMIN — SUCCINYLCHOLINE 180 MG: 20 INJECTION, SOLUTION INTRAMUSCULAR; INTRAVENOUS at 02:01

## 2025-01-01 RX ADMIN — EPINEPHRINE 100 MCG: 1 INJECTION, SOLUTION, CONCENTRATE INTRAVENOUS at 10:01

## 2025-01-01 RX ADMIN — HYDROXOCOBALAMIN 5 G: 5 INJECTION, POWDER, LYOPHILIZED, FOR SOLUTION INTRAVENOUS at 08:01

## 2025-01-01 RX ADMIN — CALCIUM CHLORIDE 1 G: 100 INJECTION, SOLUTION INTRAVENOUS at 07:01

## 2025-01-01 RX ADMIN — VASOPRESSIN 5 UNITS: 20 INJECTION INTRAVENOUS at 09:01

## 2025-01-01 RX ADMIN — ROCURONIUM BROMIDE 50 MG: 10 INJECTION, SOLUTION INTRAVENOUS at 02:01

## 2025-01-01 RX ADMIN — NOREPINEPHRINE BITARTRATE 0.04 MCG/KG/MIN: 1 INJECTION, SOLUTION, CONCENTRATE INTRAVENOUS at 05:01

## 2025-01-01 RX ADMIN — FENTANYL CITRATE 100 MCG: 50 INJECTION, SOLUTION INTRAMUSCULAR; INTRAVENOUS at 03:01

## 2025-01-01 RX ADMIN — VASOPRESSIN 3 UNITS: 20 INJECTION INTRAVENOUS at 07:01

## 2025-01-01 RX ADMIN — LIDOCAINE HYDROCHLORIDE 100 MG: 20 INJECTION, SOLUTION EPIDURAL; INFILTRATION; INTRACAUDAL at 10:01

## 2025-01-01 RX ADMIN — CEFAZOLIN 2 G: 330 INJECTION, POWDER, FOR SOLUTION INTRAMUSCULAR; INTRAVENOUS at 03:01

## 2025-01-01 RX ADMIN — EPINEPHRINE 0.1 MCG/KG/MIN: 1 INJECTION INTRAMUSCULAR; INTRAVENOUS; SUBCUTANEOUS at 09:01

## 2025-01-01 RX ADMIN — VASOPRESSIN 16 UNITS: 20 INJECTION INTRAVENOUS at 10:01

## 2025-01-01 RX ADMIN — ONDANSETRON HYDROCHLORIDE 4 MG: 2 INJECTION, SOLUTION INTRAVENOUS at 11:01

## 2025-01-07 PROBLEM — I71.010 DISSECTION OF ASCENDING AORTA: Status: ACTIVE | Noted: 2025-01-01

## 2025-01-07 NOTE — ANESTHESIA PROCEDURE NOTES
Arterial    Diagnosis: Dissection of ascending aorta    Patient location during procedure: done in OR    Staffing  Authorizing Provider: Kaley Nick MD  Performing Provider: Dwight Dallas MD    Staffing  Performed by: Dwight Dallas MD  Authorized by: Kaley Nick MD    Anesthesiologist was present at the time of the procedure.    Preanesthetic Checklist  Completed: patient identified, IV checked, site marked, risks and benefits discussed, surgical consent, monitors and equipment checked, pre-op evaluation, timeout performed and anesthesia consent givenArterial  Skin Prep: chlorhexidine gluconate  Local Infiltration: lidocaine  Orientation: left  Location: radial    Catheter Size: 20 G  Catheter placement by Ultrasound guidance. Heme positive aspiration all ports.   Vessel Caliber: medium, patent  Needle advanced into vessel with real time Ultrasound guidance.Insertion Attempts: 1  Assessment  Dressing: secured with tape and tegaderm  Patient: Tolerated well

## 2025-01-07 NOTE — ANESTHESIA PREPROCEDURE EVALUATION
[facility]  Anesthesia Pre-Operative Evaluation        Patient Name: Vickey Coreas  YOB: 1990  MRN: [id number]    SUBJECTIVE:     Pre-operative Evaluation for Procedure(s) (LRB):  ASCENDING AORTA REPAIR (N/A)     01/07/2025    Vickey Coreas is a 34 y.o. male with a PMHx significant for HTN who presents to [facility] as a transfer from [facility] for surgical management of acute type A ascending aortic dissection.       Previous Airway : None documented        nicardipine  0-15 mg/hr Intravenous Continuous           There is no problem list on file for this patient.      Review of patient's allergies indicates:  No Known Allergies    Current Outpatient Medications   Medication Instructions    fluticasone propionate (FLONASE) 50 mcg, Each Nostril, 2 times daily PRN    loratadine (CLARITIN) 10 mg, Oral, Daily       No past surgical history on file.    Social History     Substance and Sexual Activity   Drug Use Not Currently     Alcohol Use: Not on file     Tobacco Use: High Risk (1/6/2025)    Patient History     Smoking Tobacco Use: Every Day     Smokeless Tobacco Use: Never     Passive Exposure: Not on file       OBJECTIVE:     Vital Signs Range (Last 24H):  Temp:  [36.6 °C (97.8 °F)-37.1 °C (98.8 °F)]   Pulse:  []   Resp:  [12-37]   BP: (137-233)/(48-93)   SpO2:  [95 %-98 %]       Significant Labs    Heme Profile  Lab Results   Component Value Date    WBC 10.72 01/06/2025    HGB 15.0 01/06/2025    HCT 43.0 01/06/2025     01/06/2025       Coagulation Studies  Lab Results   Component Value Date    LABPROT 7.3 01/06/2025    LABPROT 15.5 (H) 01/06/2025    INR 1.2 01/06/2025    APTT 26.9 01/06/2025       BMP  Lab Results   Component Value Date     01/06/2025    K 2.8 (L) 01/06/2025     (H) 01/06/2025    CO2 16 (L) 01/06/2025    BUN 13.8 01/06/2025    CREATININE 1.64 (H) 01/06/2025    MG 2.10 01/06/2025       Liver Function Tests  Lab Results  "  Component Value Date    AST 23 01/06/2025    ALT 19 01/06/2025    ALKPHOS 73 01/06/2025    BILITOT 0.5 01/06/2025    ALBUMIN 3.9 01/06/2025       Lipid Profile  No results found for: "CHOL", "HDL", "LDLDIRECT", "TRIG"    Endocrine Profile  No results found for: "HGBA1C", "TSH"      Cardiac Studies    EKG:   No results found for this or any previous visit.    TTE:  No results found for this or any previous visit.    ASSESSMENT/PLAN:           Pre-op Assessment    I have reviewed the Patient Summary Reports.     I have reviewed the Nursing Notes. I have reviewed the NPO Status.   I have reviewed the Medications.     Review of Systems  Anesthesia Hx:  No problems with previous Anesthesia               Denies Personal Hx of Anesthesia complications.                    Social:  Non-Smoker       Hematology/Oncology:  Hematology Normal   Oncology Normal                                   EENT/Dental:  EENT/Dental Normal           Cardiovascular:  Exercise tolerance: good   Hypertension                                          Pulmonary:  Pulmonary Normal                       Renal/:  Renal/ Normal                 Hepatic/GI:  Hepatic/GI Normal                    Musculoskeletal:  Musculoskeletal Normal                Neurological:  Neurology Normal                                      Endocrine:  Endocrine Normal            Dermatological:  Skin Normal    Psych:  Psychiatric Normal                    Physical Exam  General: Well nourished, Cooperative and Alert    Airway:  Mallampati: II   Mouth Opening: Normal  TM Distance: Normal  Tongue: Normal  Neck ROM: Normal ROM    Dental:  Intact    Heart:  Rate: Tachycardia        Anesthesia Plan  Type of Anesthesia, risks & benefits discussed:    Anesthesia Type: Gen ETT  Intra-op Monitoring Plan: Standard ASA Monitors, Art Line, Central Line and ALVAREZ  Post Op Pain Control Plan: multimodal analgesia and IV/PO Opioids PRN  Induction:  IV  Airway Plan: Video, " Post-Induction  Informed Consent: Informed consent signed with the Patient and all parties understand the risks and agree with anesthesia plan.  All questions answered.   ASA Score: 5 Emergent  Day of Surgery Review of History & Physical: H&P Update referred to the surgeon/provider.    Ready For Surgery From Anesthesia Perspective.     .

## 2025-01-07 NOTE — ANESTHESIA PROCEDURE NOTES
ALVAREZ    Diagnosis: aortic dissection  Patient location during procedure: OR  Exam type: Baseline    Staffing  Performed: anesthesiologist     Anesthesiologist: Kaley Nick MD        Anesthesiologist Present  Yes      Setup & Induction  Patient preparation: bite block inserted  Probe Insertion: easy  Exam: completeDoppler Echo: 2D, color flow mapping and continuous wave Doppler.  Exam     Left Heart  Left Atruim: normal and normal (men 3.0-4.0; women 2.7-3.8)    Left Ventricle: cm, normal (men 4.2-5.9; women 3.8-5.2)  LV Wall Thickness (posterior wall):1.9 cm, severly abnormal (men >1.6 cm; women > 1.5 cm)    LVAD:no  Estimated Ejection Fraction: > 55% normal  Regional Wall Abnormalities: no RWMA            Right Heart  Right Ventricle: normal  Right Ventricle Function: normal  Right Atria:  normal    Intra Atrial Septum  PFO: yes by color flow doppler  no IAS aneurysm  no lipomatous hypertrophy  no Atrial Septal Defect (Asd)    Right Ventricle  Size: normal, Free Wall Thickness: normal    Aortic Valve:  Stenosis: none.  Morphology: trileaflet    Regurgitation: moderate to severe aortic regurgitation      Mitral Valve:   Morphology:normal  Jet Description: none    Tricuspid Valve:  Morphology: normal  Regurgitation: none    Pulmonic Valve:  Morphology:normal  Regurgitation(color flow): none    Great Vessels  Ascending Aorta Diameter: 3.8 cm dissection   Ascending Aorta Atherosclerosis: 1=nl-min dz  Aortic Arch Atherosclerosis: 1=nl-min dz  Descending Aorta Atherosclerosis: 1=nl-min dz      Effusions none    SummaryFindings discussed with surgeon.    Other Findings   Normal RV size and function. Severe LVH with normal Left ventricular function. Estimated EF 50%  Trileaflet aortic valve with moderate to severe aortic insufficiency; eccentric jet, anteriorly directed to the underside of the anterior mitral valve leaflet; Dissection seen originating in the proximal ascending aorta and extending to the descending  aorta.True lumen significantly compressed.  All other valves structurally normal.  Small PFO with Left to Right shunt    Post:  Images acquired prior to and during failed separation from CPB    Severe biventricular dysfunction, LVEF less than 10%  Severe aortic regurgitation  Dissection flap present in descending thoracic aorta

## 2025-01-07 NOTE — ED PROVIDER NOTES
Encounter Date: 1/6/2025       History     Chief Complaint   Patient presents with    Numbness     Pt presents from home reporting sudden onset of LLE numbness. Denies trauma. Denies pain. Pt states he is unable to walk on it. EMS reports pt was dragging his foot when attempting to ambulate. Denies back pain or b/b incontinence. Sensation intact to RLE. Pt denies being able to feel painful stimuli on LLE. Ankle monitor noted to L ankle.     33 yo m with h/o htn here with sudden onset left leg numbness and weakness, reports he cannot even tell he has a left leg. Denies back pain, chest pain or other weakness. Denies etoh or drug use. No priro episodes of similar symptoms. Prior to my evaluation had vomited and defecated on himself. He reports some lower abdominal discomfort and the sensation of needing to vomit      The history is provided by the patient and the EMS personnel. No  was used.     Review of patient's allergies indicates:  No Known Allergies  Past Medical History:   Diagnosis Date    Hypertension      History reviewed. No pertinent surgical history.  No family history on file.  Social History     Tobacco Use    Smoking status: Every Day     Current packs/day: 1.00     Types: Cigarettes    Smokeless tobacco: Never   Substance Use Topics    Alcohol use: Not Currently    Drug use: Not Currently     Review of Systems   Constitutional:  Negative for fever.   Respiratory:  Negative for cough and shortness of breath.    Cardiovascular:  Negative for chest pain.   Gastrointestinal:  Negative for abdominal pain.   Genitourinary:  Negative for difficulty urinating and dysuria.   Musculoskeletal:  Negative for gait problem.   Skin:  Negative for color change.   Neurological:  Positive for numbness. Negative for dizziness, speech difficulty and headaches.   Psychiatric/Behavioral:  Negative for hallucinations and suicidal ideas.    All other systems reviewed and are negative.      Physical Exam      Initial Vitals [01/06/25 2218]   BP Pulse Resp Temp SpO2   (!) 214/93 91 18 97.8 °F (36.6 °C) 97 %      MAP       --         Physical Exam    Nursing note and vitals reviewed.  Constitutional: He appears well-developed and well-nourished. He appears distressed.   HENT:   Head: Normocephalic and atraumatic.   Nose: Nose normal. Mouth/Throat: Oropharynx is clear and moist.   Eyes: Conjunctivae and EOM are normal. Pupils are equal, round, and reactive to light.   Neck: Neck supple.   Normal range of motion.  Cardiovascular:  Normal rate, regular rhythm and normal heart sounds.           Pulmonary/Chest: Breath sounds normal. No respiratory distress. He has no wheezes. He has no rhonchi. He has no rales. He exhibits no tenderness.   Abdominal: Abdomen is soft. Bowel sounds are normal.   Musculoskeletal:         General: No edema.      Cervical back: Normal range of motion and neck supple.     Neurological: He is alert and oriented to person, place, and time. No cranial nerve deficit.   Neglect and nonuse of left lower extremity.   Skin: Skin is warm and dry. Capillary refill takes less than 2 seconds. No rash and no abscess noted. No erythema.   Psychiatric: His behavior is normal. Judgment and thought content normal.   Anxious restless         ED Course   Critical Care    Date/Time: 1/6/2025 11:45 PM    Performed by: Shawnee Piper MD  Authorized by: Shawnee Piper MD  Direct patient critical care time: 45 minutes  Additional history critical care time: 5 minutes  Ordering / reviewing critical care time: 10 minutes  Documentation critical care time: 10 minutes  Consulting other physicians critical care time: 15 minutes  Total critical care time (exclusive of procedural time) : 85 minutes  Critical care was necessary to treat or prevent imminent or life-threatening deterioration of the following conditions: circulatory failure and CNS failure or compromise.  Critical care was time spent personally by me on  the following activities: development of treatment plan with patient or surrogate, discussions with consultants, evaluation of patient's response to treatment, interpretation of cardiac output measurements, examination of patient, obtaining history from patient or surrogate, ordering and performing treatments and interventions, ordering and review of laboratory studies, ordering and review of radiographic studies, pulse oximetry, re-evaluation of patient's condition and review of old charts.        Labs Reviewed   COMPREHENSIVE METABOLIC PANEL - Abnormal       Result Value    Sodium 140      Potassium 2.8 (*)     Chloride 108 (*)     CO2 16 (*)     Glucose 251 (*)     Blood Urea Nitrogen 13.8      Creatinine 1.64 (*)     Calcium 8.8      Protein Total 7.3      Albumin 3.9      Globulin 3.4      Albumin/Globulin Ratio 1.1      Bilirubin Total 0.5      ALP 73      ALT 19      AST 23      eGFR 56      Anion Gap 16.0      BUN/Creatinine Ratio 8     PROTIME-INR - Abnormal    PT 15.5 (*)     INR 1.2     URINALYSIS, REFLEX TO URINE CULTURE - Abnormal    Color, UA Colorless      Appearance, UA Clear      Specific Gravity, UA 1.010      pH, UA 6.0      Protein, UA Trace (*)     Glucose, UA 3+ (*)     Ketones, UA Negative      Blood, UA Trace (*)     Bilirubin, UA Negative      Urobilinogen, UA Normal      Nitrites, UA Negative      Leukocyte Esterase, UA Negative      RBC, UA 0-5      WBC, UA 0-5      Bacteria, UA None Seen      Squamous Epithelial Cells, UA None Seen      Mucous, UA Trace (*)    CBC WITH DIFFERENTIAL - Abnormal    WBC 10.72      RBC 5.19      Hgb 15.0      Hct 43.0      MCV 82.9      MCH 28.9      MCHC 34.9      RDW 14.4      Platelet 192      MPV 10.4      Neut % 52.0      Lymph % 40.5      Mono % 5.3      Eos % 0.5      Basophil % 0.5      Lymph # 4.34      Neut # 5.58      Mono # 0.57      Eos # 0.05      Baso # 0.05      IG# 0.13 (*)     IG% 1.2      NRBC% 0.0     APTT - Normal    PTT 26.9     DRUG  SCREEN, URINE (BEAKER) - Normal    Amphetamines, Urine Negative      Barbiturates, Urine Negative      Benzodiazepine, Urine Negative      Cannabinoids, Urine Negative      Cocaine, Urine Negative      Fentanyl, Urine Negative      MDMA, Urine Negative      Opiates, Urine Negative      Phencyclidine, Urine Negative      pH, Urine 6.0      Specific Gravity, Urine Auto 1.010      Narrative:     Cut off concentrations:    Amphetamines - 1000 ng/ml  Barbiturates - 200 ng/ml  Benzodiazepine - 200 ng/ml  Cannabinoids (THC) - 50 ng/ml  Cocaine - 300 ng/ml  Fentanyl - 1.0 ng/ml  MDMA - 500 ng/ml  Opiates - 300 ng/ml   Phencyclidine (PCP) - 25 ng/ml    Specimen submitted for drug analysis and tested for pH and specific gravity in order to evaluate sample integrity. Suspect tampering if specific gravity is <1.003 and/or pH is not within the range of 4.5 - 8.0  False negatives may result form substances such as bleach added to urine.  False positives may result for the presence of a substance with similar chemical structure to the drug or its metabolite.    This test provides only a PRELIMINARY analytical test result. A more specific alternate chemical method must be used in order to obtain a confirmed analytical result. Gas chromatography/mass spectrometry (GC/MS) is the preferred confirmatory method. Other chemical confirmation methods are available. Clinical consideration and professional judgement should be applied to any drug of abuse test result, particularly when preliminary positive results are used.    Positive results will be confirmed only at the physicians request. Unconfirmed screening results are to be used only for medical purposes (treatment).        ALCOHOL,MEDICAL (ETHANOL) - Normal    Ethanol Level <10.0     TROPONIN I - Normal    Troponin-I 0.026     MAGNESIUM - Normal    Magnesium Level 2.10     CBC W/ AUTO DIFFERENTIAL    Narrative:     The following orders were created for panel order CBC auto  differential.  Procedure                               Abnormality         Status                     ---------                               -----------         ------                     CBC with Differential[[id number]]       Abnormal            Final result                 Please view results for these tests on the individual orders.   LACTIC ACID, PLASMA   TYPE & SCREEN     EKG Readings: (Independently Interpreted)   Obtained at 1208 am rate 98 nsr with right atrial enlargement, anterolateral t wave inversions, prolonged QT (qtc 533)       Imaging Results              CTA Head and Neck (xpd) (Preliminary result)  Result time 01/07/25 00:01:38      Preliminary result by Louis Pride MD (01/07/25 00:01:38)                   Narrative:    START OF REPORT:  Technique: CT angiogram of the intracranial vessels was performed with intravenous contrast with direct axial as well as sagittal and coronal reformations. CT angiogram of the neck vessels was performed with intravenous contrast with direct axial as well as sagittal and coronal reformations.    Comparison: Comparison is with head CT study dated 2025-01-06 22:50:14.    Clinical history: Left leg numbness.    Findings: Please note the aortic findings will be discussed separately. There there appears to be likely extension of the dissection into the right brachiocephalic and likely left subclavian and proximal left common carotid artery although streak artifact and motion artifact at the origin of these vessels decreases sensitivity and specificity. There is not appear to be extension of dissection into the vertebrals or common carotids bilaterally.  Intracranial Vascular structures:  Internal carotid arteries: Unremarkable.  Middle cerebral arteries: Unremarkable.  Anterior cerebral arteries: Unremarkable.  Vertebral arteries: Unremarkable.  Basilar artery: Unremarkable.  Posterior cerebral arteries: There is fetal origin of the right posterior cerebral artery  with patent P1 segment. The left posterior cerebral artery is unremarkable.  Posterior communicating arteries: A right posterior communicating artery is seen.  Carotids:  Common carotid arteries: Unremarkable.  Internal carotid artery: Unremarkable.  Vertebral arteries: Unremarkable.  Brain parenchyma: No abnormal intracranial enhancement is seen on the post contrast images.    Notifications: The results were discussed prior to dictation with the emergency room physician (Dr Piper) at 2025-01-07 00:01:07 CST.      Impression:  1. No abnormal intracranial enhancement is seen on the post contrast images.  2. Please note the aortic findings will be discussed separately. There there appears to be likely extension of the dissection into the right brachiocephalic and likely left subclavian and proximal left common carotid artery although streak artifact and motion artifact at the origin of these vessels decreases sensitivity and specificity. There is not appear to be extension of dissection into the vertebrals or common carotids bilaterally. Recommend additional evaluation and follow-up of the arch vessels possibly with direct angiography as clinically indicated.  3. Unremarkable CT angiogram of the head and neck. Details as above.                                         CT Lumbar Spine Without Contrast (Preliminary result)  Result time 01/06/25 23:40:58      Preliminary result by Louis Pride MD (01/06/25 23:40:58)                   Narrative:    START OF REPORT:  Technique: CT of the lumbar spine was performed without intravenous contrast with direct axial as well as sagittal and coronal reconstruction images.    Comparison: None.    Clinical history: Left leg numbness.    Findings:  Anatomy: Unremarkable. Transitional lumbosacral junction with a sacralized L5.  Mineralization: The bony mineralization is within normal limits.  Congenital: None.  Bone alignment: Unremarkable with no significant  listhesis.  Curvature: The lumbar lordosis is maintained.  Bone and bone marrow: The vertebral body heights are maintained.  Intervertebral disc spaces: The intervertebral discs are preserved throughout.  Osteophytes: None.  Endplate Sclerosis: No endplate sclerosis is seen.  Facet degenerative changes: No facet degenerative changes are seen.  Spinal canal: Unremarkable with no bony spinal canal stenosis identified.  Fractures: No acute lumbar spine fracture dislocation or subluxation is seen.  Orthopedic Hardware: None.  Vertebral Fusion: None.  T12- L1: Normal.  L1- 2: Normal.  L2- 3: Normal.  L3- 4: Normal.  L4- 5: Broad based disc bulge. Small central disc protrusion. Mild central canal stenosis and thecal sac compression. Mild lateral recess stenosis. Associated mass effect on the bilateral traversing nerve root.  L5- S1: Normal. Disc.  Visualized sacrum: Normal.  Miscellaneous: Limited view of the abdomen on the coronal localizer demonstrates. Bilateral renal calculi largest of which measures 8-9 mm at upper pole of the right kidney. There is non-specific bilateral perinephric fat stranding.      Impression:  1. No acute lumbar spine fracture dislocation or subluxation is seen.  2. Details and other findings as above.                                         CTA Chest Abdomen Pelvis (In process)                      CT Head Without Contrast (Preliminary result)  Result time 01/06/25 23:08:35      Preliminary result by Louis Pride MD (01/06/25 23:08:35)                   Narrative:    START OF REPORT:  Technique: CT of the head was performed without intravenous contrast with axial as well as coronal and sagittal images.    Comparison: Comparison is with study dated 2020-11-26 15:20:21.    Dosage Information: Automated Exposure Control was utilized 1023.79 mGy.cm.    Clinical history: Left leg numbness.    Findings:  Hemorrhage: No acute intracranial hemorrhage is seen.  CSF spaces: The ventricles sulci and  basal cisterns are within normal limits.  Brain parenchyma: There is preservation of the grey white junction throughout. No acute infarct.  Cerebellum: Unremarkable.  Vascular: Unremarkable venous sinuses.  Sella and skull base: The sella appears to be within normal limits for age.  Cerebellopontine angles: Within normal limits.  Herniation: None.  Intracranial calcifications: Incidental note is made of bilateral choroid plexus calcification. Incidental note is made of some pineal region calcification. Incidental note is made of some calcification of the falx. Incidental note is made of minimal left basal ganglia calcifcation.  Calvarium: No acute linear or depressed skull fracture is seen.    Maxillofacial Structures:  Paranasal sinuses: The visualized paranasal sinuses appear clear with no significant mucoperiosteal thickening or air fluid levels identified.  Orbits: The orbits appear unremarkable.  Zygomatic arches: The zygomatic arches are intact and unremarkable.  Temporal bones and mastoids: The temporal bones and mastoids appear unremarkable.  TMJ: The mandibular condyles appear normally placed with respect to the mandibular fossa.      Impression:  1. No acute intracranial process identified. Details as above.                                         X-Ray Chest 1 View (In process)                   X-Rays:   Independently Interpreted Readings:   Chest X-Ray: Widened mediastinum     Medications   niCARdipine 40 mg/200 mL (0.2 mg/mL) infusion (15 mg/hr Intravenous Rate/Dose Change 1/7/25 0011)   esmolol 2000 mg in sodium chloride 0.9% 100 mL (20 mg/mL) (40 mcg/kg/min × 93 kg Intravenous Rate/Dose Change 1/7/25 0012)   ondansetron injection 4 mg (4 mg Intravenous Given 1/6/25 2315)   esmoloL injection 47 mg (47 mg Intravenous Given 1/6/25 2346)   HYDROmorphone (PF) injection 1 mg (1 mg Intravenous Given 1/6/25 2343)   iohexoL (OMNIPAQUE 350) injection 100 mL (100 mLs Intravenous Given 1/6/25 2335)   iohexoL  (OMNIPAQUE 350) injection 100 mL (100 mLs Intravenous Given 1/6/25 5066)     Medical Decision Making  Given patient's presentation, differential diagnosis includes but is not limited to cva, t ia, etoh or drug use, lumbar pathology, aortic dissection  To evaluate these  possible etiologies cbc, cmp, coags, type and screen, lactic, cxr, ct head, cta  head and neck and chest abd pelvis were ordered and reviewed  Aortic dissection noted, worked on immediate BP and heart rate control, consutled ct surgery per ed course, transfer    Problems Addressed:  Dissection of ascending aorta: acute illness or injury that poses a threat to life or bodily functions  Hypertensive emergency: acute illness or injury that poses a threat to life or bodily functions  Left leg numbness: acute illness or injury that poses a threat to life or bodily functions  Left leg weakness: acute illness or injury that poses a threat to life or bodily functions    Amount and/or Complexity of Data Reviewed  Labs: ordered.  Radiology: ordered and independent interpretation performed.  ECG/medicine tests: ordered and independent interpretation performed.    Risk  OTC drugs.  Prescription drug management.  Parenteral controlled substances.  Drug therapy requiring intensive monitoring for toxicity.  Decision regarding hospitalization.  Emergency major surgery.    Critical Care  Total time providing critical care: 85 minutes              Attending Attestation:     Physician Attestation Statement for NP/PA:   I personally made/approved the management plan and take responsibility for the patient management.    Other NP/PA Attestation Additions:    History of Present Illness: 33 yo m with h/o htn here with sudden onset left leg numbness and weakness, reports he cannot even tell he has a left leg. Denies back pain, chest pain or other weakness. Denies etoh or drug use. No priro episodes of similar symptoms. Prior to my evaluation had vomited and defecated on  himself   Physical Exam: Very hypertensive  Normocephalic, atraumatic  Cn 2-12 intact  Regular rate, lungs clear  Abd soft nontender  Equal strength and sensation in upper extremities  Left lower extremity neglect, no sensation, no effort against gravity  Pulses decrased on left lower   Medical Decision Making: Cva, tia, lumbar pathology, aortic dissection, vertebral or carotid dissection, hypertensive emergency, etoh or drug use  Cbc, cmp, coags, lactic, type and screen, cxr, ct l spine, ct head, cta h ead and neck and cta chest abd pelvis ordered and reviewed  Immediately placed on bp control with cardene, initial discusion with neuro per ed course, working on bp control so patient did not receive thrombolytics luckily as on cta head and neck type a aortic dissection was noted. Consulted cardiothoracic surgery who does not have capability to manage at this facility and recommends transfer. Transfer request placed. Added pain control, esmolol             ED Course as of 01/07/25 0015   Mon Jan 06, 2025   2309 Activated as code fast, lsn 1 hour pta [BS]   2310 Nihss 7-8 [BS]   2315 I discussed with dr moore who agrees with thrombolytics once bp under control [BS]   2335 Pt has not received thrombolytics, on cta neck noted dissection, cta chest abd pelvis added, dissection noted through arch. Esmolol ordered, iv analgesia ordered, cardiothoracic surgery consulted [BS]   2344 Discussed with dr becker, on call for cardiothoracic surgery who reports he is not able to do a type a, aortic arch dissection and recommends transfer to a center capable of this [BS]   2357 Notified by radiology of dissection   [BS]   2358 Transfer center from [facility] called, trying to get in touch with ct surgery on call and will call back [BS]   Tue Jan 07, 2025   0009 Ed to ed transfer per dr delvalle, cardiothoracic surgery [BS]   0012 Dr delvalle in [place] accepts transfer, recommends fastest method possible, working on airmed  transfer,ed to ed and he will get or ready [BS]      ED Course User Index  [BS] Shawnee Piper MD                           Clinical Impression:  Final diagnoses:  [R20.0] Left leg numbness  [I16.1] Hypertensive emergency (Primary)  [I71.010] Dissection of ascending aorta  [R29.898] Left leg weakness          ED Disposition Condition    Transfer to Another Facility Stable                Shawnee Piper MD  01/07/25 0002       Shawnee Piper MD  01/07/25 0013       Shawnee Piper MD  01/07/25 0015

## 2025-01-07 NOTE — ED TRIAGE NOTES
Pt presents from home reporting sudden onset of LLE numbness. Denies trauma. Denies pain. Pt states he is unable to walk on it. EMS reports pt was dragging his foot when attempting to ambulate. Denies back pain or b/b incontinence. Sensation intact to RLE. Pt denies being able to feel painful stimuli on LLE. Ankle monitor noted to L ankle.

## 2025-01-07 NOTE — ANESTHESIA PROCEDURE NOTES
Central Line    Diagnosis: Dissection of ascending aorta  Patient location during procedure: done in OR  Procedure Urgency: Emergent      Staffing  Authorizing Provider: Kaley Nick MD  Performing Provider: Dwight Dallas MD    Staffing  Performed by: Dwight Dallas MD  Authorized by: Kaley Nick MD    Anesthesiologist was present at the time of the procedure.  Preanesthetic Checklist  Completed: patient identified, IV checked, site marked, risks and benefits discussed, surgical consent, monitors and equipment checked, pre-op evaluation, timeout performed and anesthesia consent given  Indication   Indication: hemodynamic monitoring, vascular access     Anesthesia   general anesthesia    Central Line   Skin Prep: skin prepped with ChloraPrep, skin prep agent completely dried prior to procedure  Sterile Barriers Followed: Yes    All five maximal barriers used- gloves, gown, cap, mask, and large sterile sheet    hand hygiene performed prior to central venous catheter insertion  Location: right internal jugular.   Catheter type: single lumen  Catheter Size: 9 Fr  Ultrasound: vascular probe with ultrasound   Vessel Caliber: medium, patent, compressibility normal  Needle advanced into vessel with real time Ultrasound guidance.  Guidewire confirmed in vessel.  sterile gel and probe cover used in ultrasound-guided central venous catheter insertion   Manometry: Venous cannualation confirmed by visual estimation of blood vessel pressure using manometry.  Insertion Attempts: 1   Securement:line sutured, chlorhexidine patch, sterile dressing applied and blood return through all ports    Post-Procedure   X-Ray: successful placement   Adverse Events:none      Guidewire Guidewire removed intact. Guidewire removed intact, verified with nurse.

## 2025-01-07 NOTE — ED NOTES
Vickey Coreas, a 34 y.o. male presents to the ED w/ complaint of Transfer   Transfer from [place]. Dx with aortic dissection     Triage note:  Chief Complaint   Patient presents with    Transfer     Transfer from [place],  with aortic dissection      Review of patient's allergies indicates:  No Known Allergies  Past Medical History:   Diagnosis Date    Hypertension

## 2025-01-07 NOTE — CHAPLAIN
25 1239   Clinical Encounter Type   Visit Type Initial Visit   Visit Category Death   Visited With Family;Health care provider   Number of Family Visited 6   Length of Visit 60 Minutes   Patient Spiritual Encounters   Care Provided Decedent Care   Family Spiritual Encounters   Care Provided Compassionate presence;Reflective listening   Family Coping Open/discussion;Sadness;Internal strength;Family/ friends resources   Comments - Family At the request of Chaplain Camarillo, I went with him to visit the family of decedent who  in surgery. Family told me decedent was not legally . Mother's contact info wasn't in Epic yet so I added her name and # to contacts. Decedent has 6 children per faimly. Five are under the age of 10, none are of legal age. Mother is legal NOK. She will use [facility] in [place] for the arrangements. No other family were en route to [facility] to view the body and all faimly left as of 1240p. Care team notified that family was gone so they could proceed with preparations to move body to the morgue. NOK will contact . I gave mother the # to our office in case she has questions.Condolences were offered and questions answered. Chaplain Camarillo rolled mother to the front lobby and stayed with her until family brought the car around to pick her up.

## 2025-01-07 NOTE — ED PROVIDER NOTES
History:  Vickey Coreas is a 34 y.o. male who presents to the ED with Transfer (Transfer from [facility] with aortic dissection )    Described as 34-year-old male presenting as a transfer from [facility] with a type a dissection.  He initially presented to the hospital due to sudden onset of left lower extremity numbness and weakness.  CTA showed a type a dissection from his aortic root to his distal left common iliac artery, extending into his right brachiocephalic artery, left subclavian artery, left common carotid artery, celiac artery, SMA, left renal artery with diminished flow to the right kidney, and diminished flow to the left lower extremity.  He was started on esmolol and Cardene drip and transferred to [facility] for Cardiothoracic surgery for emergent repair.    Review of Systems: All systems reviewed and are negative except as per history of present illness.    Medications:   Previous Medications    FLUTICASONE PROPIONATE (FLONASE) 50 MCG/ACTUATION NASAL SPRAY    1 spray (50 mcg total) by Each Nostril route 2 (two) times daily as needed for Rhinitis.    LORATADINE (CLARITIN) 10 MG TABLET    Take 1 tablet (10 mg total) by mouth once daily. for 10 days       PMH:   Past Medical History:   Diagnosis Date    Hypertension      PSH: No past surgical history on file.  Allergies: He has No Known Allergies.  Social History: Marital Status: single. He  reports that he has been smoking cigarettes. He has never used smokeless tobacco.. He  reports that he does not currently use alcohol..       Exam:  VITAL SIGNS: There were no vitals filed for this visit.  Const: Awake and alert, NAD  Head: Atraumatic  Eyes: Normal Conjunctiva  ENT: Normal External Ears, Nose and Mouth.  Neck: Full range of motion. No meningismus.  Resp: Normal respiratory effort, No distress  Abd: Soft, non tender, non distended.  Skin: No petechiae or rashes  Ext:  Cold left lower extremity with no palpable pulse  Neur: Awake and  alert, moves bilateral upper extremities normally, answers questions appropriately, normal mentation  Psych: Normal Mood and Affect    Data:  Results for orders placed or performed during the hospital encounter of 25   Prepare RBC 2 Units; emergency Type A dissection    Collection Time: 25 12:15 AM   Result Value Ref Range    UNIT NUMBER [id number]     Product Code E4008A15     DISPENSE STATUS ISSUED     CODING SYSTEM SDPC378     Unit Blood Type Code 9500     Unit Blood Type O NEG     Unit Expiration [id number]     CROSSMATCH INTERPRETATION Requested     UNIT NUMBER [id number]     Product Code M9499X76     DISPENSE STATUS ISSUED     CODING SYSTEM RCVL496     Unit Blood Type Code 9500     Unit Blood Type O NEG     Unit Expiration [id number]     CROSSMATCH INTERPRETATION Requested      Imaging Results    None       Labs & Imaging studies were reviewed independently by me.     Medical Decision Makin-year-old male transferred for Cardiothoracic surgery evaluation of a type a dissection.  He was brought emergently to the operating room on esmolol and Cardene drip for blood pressure/heart rate control, type and screen performed, admitted to Cardiothoracic surgery Service.    Critical Care Time: 30 minutes  Treatments/Evaluations: Close monitoring and treatment of unstable vital signs, cardiorespiratory, and neurologic status, while maintaining tight balance of fluid, respiratory, and cardiac interventions. This time includes discussing the case with the patient and the patients family. This time does not include all procedures stated elsewhere in this record. This time also includes reviewing old records, labs and radiological studies. This time includes examining and re-examining the patient. Additionally, this time also includes arranging care with admitting and consulting physicians.       Clinical Impression:  1. Dissection of ascending aorta                 Sakina Looney,  MD  01/07/25 0332

## 2025-01-07 NOTE — ED NOTES
"Pt to ED via [facility] c/o numbness to left leg. Pt took a shower and then reported he could not feel his entire leg from the level of the hip. No response to painful stimuli. Pulse intact. HTN on arrival, GCS 15, defecated on himself and said "I couldn't stop it". Denies hx. All other extremities move with purpose and sensation intact. MARIA ELENA Caballero immediately at BS and gave verbal orders for CT scans. Transported to CT with RN, NP, and RT.   "

## 2025-01-07 NOTE — HOSPITAL COURSE
The patient was taken to OR emergently for ascending and total arch aortic replacement. All three major head vessels involved and debranching performed. Circulatory arrest required. Once cardiopulmonary bypass was established, patient's lactic acid levels increased to >20 on multiple repeat lab draws. Attempts were made to wean from bypass however patient was requiring significant pressor and inotropic support (epinephrine, norepinephrine, vasopressin, and giapreza). Patient also with diffuse bleeding indicative of coagulopathy and multiple blood transfusions given. ALVAREZ performed which showed severe AI and very poor heart function with minimal EF despite inotropic support. Discussion had between all operative team members and further attempts at heroic measures agreed upon to be futile given patient's condition. Cardiopulmonary bypass circuit disconnected and patient removed from ventilator. Time of death called at 10:56am.

## 2025-01-07 NOTE — H&P
"Cardiothoracic Surgery Consultation Note  Vickey Coreas  01/07/2025       HPI:  Pt is a 35 yo male with hx of HTN who presents to [facility] as a transfer from [facility] for surgical management of acute type A ascending aortic dissection. The patient initally presented to ED in [place] with sudden onset paralysis of LLE. Workup there including a CTA C/A/P obtained and showed acute type A ascending aortic dissection with dissection flap involving all three arch vessels and extending down to and involving the celiac artery, SMA, bilateral renal arteries with minimal contrast uptake into R kidney, and bilateral iliac arteries with near occlusion of true lumen of L iliac artery. Pt started on cardene and esmolol drips which were at maximum doses on arrival. Hemodynamically stable, conversing upon arrival. LLE insensate, loss of motor function, cold to touch. Taken urgent from ED mcdonald to OR for surgical intervention.     ROS: JONI due to acuity of situation    Past Medical History:   Diagnosis Date    Hypertension      PSH: Unknown    Social History     Socioeconomic History    Marital status: Single   Tobacco Use    Smoking status: Every Day     Current packs/day: 1.00     Types: Cigarettes    Smokeless tobacco: Never   Substance and Sexual Activity    Alcohol use: Not Currently    Drug use: Not Currently     Review of patient's allergies indicates:  No Known Allergies     Physical Exam:  Vitals:    01/07/25 0230   BP: 137/62   Pulse: 98   Resp: 20   Temp: 98.8 °F (37.1 °C)   TempSrc: Oral   SpO2: 95%   Weight: 93 kg (205 lb)   Height: 5' 11" (1.803 m)        Gen: Appears ill, diaphoretic, conversing, alert  HEENT: NCAT, trachea midline  Cardio: HDS, esmolol and cardene gtt  Resp: NLB on oxygen supplementation via NC  Ext: LLE cold to touch  Neuro: No sensation or motor function in LLE, moving all other extremities     Labs:  Recent Labs   Lab 01/06/25  2335   WBC 10.72   HGB 15.0   HCT 43.0    "      K 2.8*   *   CO2 16*   BUN 13.8   MG 2.10   INR 1.2   BILITOT 0.5   AST 23   ALT 19   ALKPHOS 73   ALBUMIN 3.9       Imaging:  CTA C/A/P (1/7/25, OSH):  Impression:  1. There is an acute aortic dissection seen commencing from the aortic root and terminating in the distal left common iliac artery seen on series 12 image 41- 105. This is consistent with an acute aortic dissection [place] A DeBakey type I. There appears to be extension into the right brachiocephalic artery as well as the left proximal subclavian artery with possible minimal extension into the the most proximal left common carotid artery. There appears to be extension into the celiac artery including some of its branch vessels with apparent proximal extension into the superior mesenteric artery. There appears to be extension into the left renal artery with a diminutive right renal artery suggesting significant involvement with diminished flow to the right kidney. The dissection also extends into the left common iliac vein which demonstrates a small lumen consistent with significant extension of the dissection into the left common iliac artery with flow throughout the left common iliac artery and into the SFA and popliteal on the left. There is also extension of the dissection to the right common iliac artery and likely right internal and external iliac arteries with good flow however into the right common femoral artery and its branches. Correlate clinically as regards additional evaluation and recommend follow-up with direct angiography as clinically indicated.  2. The left kidney appears unremarkable with no stones cysts masses or hydronephrosis. There is a small cyst seen in the right mid kidney. There is hypoenhancement of the right kidney and the right renal artery seen on series 4 image 76. This is compatible with renal hypoperfusion likely secondary to the aortic dissection as detailed above. Correlate with clinical and  laboratory findings regards additional evaluation and follow-up until full resolution.  3. Details and other findings as discussed above.      Assessment/Plan:  Pt is a 33 yo male presenting as a transfer for surgical management of an acute [place] Type A ascending aortic dissection.     - Risks and benefits of surgical intervention discussed with patient promptly on arrival including high risk of mortality given the extent of dissection and organ systems involved. Mortality risk quoted 40-50%.   - Consent obtained  - Urgently taken to OR for total arch replacement with possible valve replacement, root replacement. May need involvement of vascular surgery following repair for descending thoracic aortic stenting. Will plan for repeat CT following surgery if patient stable enough for scan.   - Admitted to CTS, ICU    Tara Coffman MD  PGY-6 CTS Fellow

## 2025-01-07 NOTE — OP NOTE
DATE OF PROCEDURE:  01/07/2025     ATTENDING SURGEON:  Roscoe Cordova    Assistant Surgeon: Dev Moscoso    ASSISTANT:  Tara Coffman    1ST ASSISTANT:  Gypsy STOLL     PREOPERATIVE DIAGNOSES:  1.  Acute Josh A aortic dissection.  2.  Hypertension.     POSTOPERATIVE DIAGNOSES:  1.  Acute Josh A aortic dissection.  2.  Hypertension.     OPERATIONS PERFORMED:  1. Total arch replacement under hypothermic circulatory arrest and bilateral antegrade selective cerebral perfusion using a 30 mm branched Dacron graft  2. Aortic valve resuspension with sinotubular junction reconstruction  3. Ascending aortic replacement using a 30 mm Dacron graft     Intra-op findings:   Type 1 aortic dissection with dissection involving innominate, left carotid and left subclavian artery  Total collapse of the true lumen in DTA  Celiac, SMA and RASHAUN from the false lumen with very poor opacification  Right kidney from the false lumen with non-opacification  Dissection involving both iliacs and extending down to left CFA  Intimal tear in the proximal innominate and left carotid artery  Aortic root with significant delamination.    ANESTHESIA:  General endotracheal.     BRIEF HISTORY:  A 34-year-old male with history of hypertension who was transferred from [facility] after he presented there with left lower extremity numbness and paralysis with bowel and urinary incontinence and workup was consistent with DeBakey type 1 aortic dissection.  He was transferred via air ambulance to our center.     PROCEDURE IN DETAIL:  After obtaining informed and written consent, the patient   was brought to the Operating Room and placed on the operating table in supine position.  After induction of adequate general endotracheal anesthesia, the patient's chest, abdomen, pelvis and bilateral lower extremities were prepped and draped in the usual sterile fashion.      We started the operation by exposing the right  subclavian artery through an infra clavicular incision.  Right subclavian artery was isolated after giving heparin and 8 mm Dacron graft was sewn.    We then turned our attention to the chest, where an upper midline skin incision was made, and a median sternotomy was performed.  A pericardial well was created.  The pericardial fluid was serous.  The patient was systemically heparinized.  A pursestring was placed around the right atrial appendage.  We then turned our attention back to the right infraclavicular area, and a Dacron graft attached to the subclavian artery was connected to the arterial line.The venous cannula was inserted through the right atrial appendage.  A coronary sinus catheter was placed.  The patient was then put on cardiopulmonary bypass. Once on bypass, a left ventricular vent was placed through the right superior pulmonary vein.  We then began systemic cooling.  We  the aorta from the pulmonary artery.  We exposed the proximal and mid arch.  Another dose of ostial cardioplegia was then given. We continued with systemic cooling.      When we reached the desired core temperature of 30 degrees Celsius, the patient was exsanguinated into the heart-lung machine circuit. Antegrade cerebral perfusion was then continued through the right subclavian artery. Base of the innominate artery was clamped.  Sequential dissection of the left carotid artery and left subclavian artery was then done.  Base of the left carotid artery and left subclavian artery were ligated.  A 15 Serbian Gundrycatheter was inserted into the left carotid artery for selective antegrade cerebral perfusion.  A strip of felt was used between the delaminated balance of the aortic arch to create a juan miguel media.  A 30 mm Dacron tube graft was then sewn to the proximal arch, right at the origin of the innominate artery. Once the distal anastomosis was complete, perfusion was gently restarted as we de-aired the arch and the graft.  The  aortic cross clamp was then applied to the Dacron tube graft. Bilateral selective cerebral perfusion was continued  during the distal reconstruction.     We then turned our attention to the aortic root.  The dissection extended to the non coronary sinus. The proximal aorta was resected to the sinotubular junction.  The valve was resuspended using 3 pledgeted 4-0 Prolene sutures.  The sinotubular junction was sized, and a 30 mm graft was selected.  The graft was brought up, and the proximal anastomosis was performed using a running 4-0 Prolene suture reinforced with a felt strip.  A dose of cardioplegia was given to confirm the competence of the aortic valve and to check for any leak.  The Dacron tube graft was then tailored, and the yuqln-go-ussin anastomosis was performed using a running 4-0 Prolene suture reinforced with a felt strip.  An aortic root vent was placed.   the aortic cross-clamp was removed. Side arm of the Dacron graft was then used to sequentially bypass the innominate, left carotid artery, and left subclavian artery.   During the procedure patient's serum lactate had gone up significantly and he required increasingly higher doses self vasopressors.  His labs were also consistent with severe metabolic acidosis.  By the time we released the cross-clamp he was requiring extremely high doses of multiple vasopressors with contiguously high lactate levels and metabolic acidosis.  We made attempts at weaning him from bypass without any success. Given the significant malperfusion with which he presented and significant metabolic and hemodynamic derangements, a decision was made to withdraw support. Patient was drained and cannulas were removed. Chest and infraclavicular incision were then closed.     I was able to speak with the family in the consult room alongside after the procedure. Dr. Moscoso was also present during the portion of the operation given the complexity of the operation and was involved  during the critical portion of decision making.    Roscoe Cordova MD  Cardiac Surgery

## 2025-01-07 NOTE — ANESTHESIA PROCEDURE NOTES
Intubation    Date/Time: 1/7/2025 2:52 AM    Performed by: Dwight Dallas MD  Authorized by: Kaley Nick MD    Intubation:     Induction:  Intravenous    Intubated:  Postinduction    Mask Ventilation:  Easy with oral airway    Attempts:  1    Attempted By:  Resident anesthesiologist    Method of Intubation:  Video laryngoscopy    Blade:  Barboza 3    Laryngeal View Grade: Grade I - full view of cords      Difficult Airway Encountered?: No      Complications:  None    Airway Device:  Oral endotracheal tube    Airway Device Size:  7.5    Style/Cuff Inflation:  Cuffed (inflated to minimal occlusive pressure)    Tube secured:  23    Secured at:  The teeth    Placement Verified By:  Capnometry and Revisualization with laryngoscopy    Complicating Factors:  None    Findings Post-Intubation:  BS equal bilateral and atraumatic/condition of teeth unchanged

## 2025-01-07 NOTE — HPI
Vickey Coreas is a 35 yo male with hx of HTN who presents to [facility] as a transfer from [facility] for surgical management of acute type A ascending aortic dissection. The patient initally presented to ED in [place] with sudden onset paralysis of LLE. Workup there including a CTA C/A/P, which showed acute type A ascending aortic dissection with dissection flap involving all three arch vessels and extending down to and involving the celiac artery, SMA, bilateral renal arteries with minimal contrast uptake into R kidney, and bilateral iliac arteries with near occlusion of true lumen of L iliac artery. Pt started on cardene and esmolol drips, which were at maximum doses on arrival.    The patient presents to the SICU s/p *** with Dr. Cordova on 1/7/24. On admission, they are intubated***, sedated with ***, and in stable condition. Inotropic and vasopressor requirements upon admission are *** mcg/kg/min of epinephrine, *** mcg/kg/min of norepinephrine, and 0.04 units/min of vasopressin*** to maintain blood pressure at a MAP *** and SBP < ***. Central access includes a ***RIJ CVC, arterial access includes a ***left radial arterial line.      Intraoperatively, they received *** of crystalloid, *** of cell saver, *** PRBCs, *** FFP, *** platelets, and *** cryoprecipitate. Urine output intraoperatively was ***cc.      Immediate post-operative plans include hemodynamic stabilization and weaning of cardiac and respiratory support. Plan to wean vasopressors and inotropes as tolerated, ***wean ventilator support with goal of early extubation***, and closely monitor fluid status with strict Is/Os and continued fluid resuscitation as needed.

## 2025-01-07 NOTE — ASSESSMENT & PLAN NOTE
Patient taken urgently to OR were patient underwent ascending and total aortic arch replacement under circulatory arrest (please see operative note for details). Increasingly acidotic following graft placement and with severe diffuse coagulopathy, requiring escalating doses of multiple pressors and inotropic agents (on maximum doses of epinephrine, norepinephrine, vasopressin, and giapreza). ALVAREZ showing minimal EF despite escalating doses of inotropic support. Discussion with operative team had and further measures deemed to be futile given severity of patient's condition and underlying disease process with the dissection flap involving not only all three major head vessels but also the celiac artery, SMA, bilateral renal arteries with ischemic changes of R kidney present on CTA performed at OSH, and extending into bilateral iliac arteries with symptoms of acute limb ischemia present on arrival. Patient  in the operating room. Time of death called at 10:56am.

## 2025-01-07 NOTE — ASSESSMENT & PLAN NOTE
Mr. Coreas is a 34 y.o. male with type A ascending aortic dissection with extension to celiac artery, SMA, bilateral renal arteries with minimal contrast uptake into R kidney, and bilateral iliac arteries with near occlusion of true lumen of L iliac artery. He presents to SICU s/p repair

## 2025-01-08 LAB
BLD PROD TYP BPU: NORMAL
BLD PROD TYP BPU: NORMAL
BLOOD UNIT EXPIRATION DATE: NORMAL
BLOOD UNIT EXPIRATION DATE: NORMAL
BLOOD UNIT TYPE CODE: 5100
BLOOD UNIT TYPE CODE: 5100
BLOOD UNIT TYPE: NORMAL
BLOOD UNIT TYPE: NORMAL
CODING SYSTEM: NORMAL
CODING SYSTEM: NORMAL
CROSSMATCH INTERPRETATION: NORMAL
CROSSMATCH INTERPRETATION: NORMAL
DISPENSE STATUS: NORMAL
DISPENSE STATUS: NORMAL
UNIT NUMBER: NORMAL
UNIT NUMBER: NORMAL

## 2025-01-08 NOTE — ANESTHESIA POSTPROCEDURE EVALUATION
Anesthesia Post Evaluation    Patient: Vickey Coreas    Procedure(s) Performed: Procedure(s) (LRB):  ASCENDING AORTA REPAIR (N/A)    Final Anesthesia Type: general      Patient location during evaluation: [facility]  Patient participation: No - Unable to Participate, Other Reason (see comments)  Post-procedure mental status: .  Post-procedure vital signs: reviewed and not stable ()  Pain control: .  Airway patency: .    PONV status: .  Anesthetic complications: yes (death in operating room secondary to type A aortic dissection)  Perioperative Events: death        Cardiovascular status: .  Respiratory status: .  Follow-up not needed.  Comments: Pt  in operating room, met with family along with Dr. Cordova, pt brought to [facility] for family visitation               No case tracking events are documented in the log.      Pain/James Score: Pain Rating Prior to Med Admin: 10 (2025 12:40 AM)

## 2025-01-11 LAB
BLD PROD TYP BPU: NORMAL
BLOOD UNIT EXPIRATION DATE: NORMAL
BLOOD UNIT TYPE CODE: 5100
BLOOD UNIT TYPE: NORMAL
CODING SYSTEM: NORMAL
CROSSMATCH INTERPRETATION: NORMAL
DISPENSE STATUS: NORMAL
NUM UNITS TRANS PACKED RBC: NORMAL
NUM UNITS TRANS PACKED RBC: NORMAL
TRANS ERYTHROCYTES VOL PATIENT: NORMAL ML
TRANS ERYTHROCYTES VOL PATIENT: NORMAL ML

## (undated) DEVICE — CAUTERY HIGH TEMP 2IN FLEXIBLE

## (undated) DEVICE — FIBRILLAR ABS HEMOSTAT 4X4

## (undated) DEVICE — BIOGLUE 10ML

## (undated) DEVICE — SUT PROLENE 5-0 36IN C-1

## (undated) DEVICE — SUT VICRYL 3-0 27 SH

## (undated) DEVICE — Device

## (undated) DEVICE — GLUE BIOGLUE SYR PRE-FILL 5ML

## (undated) DEVICE — SUT 6 18IN STEEL MONO CCS

## (undated) DEVICE — APPLIER CLIP LIAGCLIP 9.375IN

## (undated) DEVICE — ELECTRODE MEGADYNE RETURN DUAL

## (undated) DEVICE — SIZER GRAFT VASCULAR 24-38MM